# Patient Record
Sex: FEMALE | Race: WHITE | NOT HISPANIC OR LATINO | Employment: FULL TIME
[De-identification: names, ages, dates, MRNs, and addresses within clinical notes are randomized per-mention and may not be internally consistent; named-entity substitution may affect disease eponyms.]

---

## 2022-01-06 ENCOUNTER — PRE VISIT (OUTPATIENT)
Dept: OTHER | Age: 32
End: 2022-01-06
Payer: COMMERCIAL

## 2022-01-06 ENCOUNTER — TRANSCRIBE ORDERS (OUTPATIENT)
Dept: OTHER | Age: 32
End: 2022-01-06
Payer: COMMERCIAL

## 2022-01-06 ENCOUNTER — PATIENT OUTREACH (OUTPATIENT)
Dept: ONCOLOGY | Facility: CLINIC | Age: 32
End: 2022-01-06
Payer: COMMERCIAL

## 2022-01-06 DIAGNOSIS — Z80.0 FAMILY HISTORY OF LYNCH SYNDROME: Primary | ICD-10-CM

## 2022-01-06 NOTE — PROGRESS NOTES
Per NPR team, patient has not tested positive for Mayer, only father has. Will refer to Genetics team for counseling and testing.

## 2022-01-06 NOTE — PROGRESS NOTES
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 No genetic testing on file or in available CE documents. Message to NPR team for records prior to scheduling with Luz Maria Irving. Will update scheduling instructions in referral as able.

## 2022-01-06 NOTE — TELEPHONE ENCOUNTER
Action 1.6.22 10:57 AM STEVEN   Action Taken Called and spoke to pt. Pt has never had any genetic testing done.

## 2022-02-06 ENCOUNTER — HEALTH MAINTENANCE LETTER (OUTPATIENT)
Age: 32
End: 2022-02-06

## 2022-03-28 ENCOUNTER — VIRTUAL VISIT (OUTPATIENT)
Dept: ONCOLOGY | Facility: CLINIC | Age: 32
End: 2022-03-28
Attending: GENETIC COUNSELOR, MS
Payer: COMMERCIAL

## 2022-03-28 DIAGNOSIS — Z84.89: ICD-10-CM

## 2022-03-28 DIAGNOSIS — Z80.41 FAMILY HISTORY OF MALIGNANT NEOPLASM OF OVARY: ICD-10-CM

## 2022-03-28 DIAGNOSIS — Z80.0 FAMILY HISTORY OF LYNCH SYNDROME: Primary | ICD-10-CM

## 2022-03-28 DIAGNOSIS — Z80.0 FAMILY HISTORY OF COLON CANCER: ICD-10-CM

## 2022-03-28 DIAGNOSIS — Z80.42 FAMILY HISTORY OF PROSTATE CANCER: ICD-10-CM

## 2022-03-28 PROCEDURE — 96040 HC GENETIC COUNSELING, EACH 30 MINUTES: CPT | Mod: GT,95 | Performed by: GENETIC COUNSELOR, MS

## 2022-03-28 NOTE — PATIENT INSTRUCTIONS
Assessing Cancer Risk  Only about 5-10% of cancers are thought to be due to an inherited cancer susceptibility gene.    These families often have:    Several people with the same or related types of cancer    Cancers diagnosed at a young age (before age 50)    Individuals with more than one primary cancer    Multiple generations of the family affected with cancer    Mayer Syndrome Genes  We each inherit two copies of every gene in our bodies: one from our mother and one from our father.  Each gene has a specific job to do.  When a gene has a mistake or  mutation  in it, it does not work like it should. There are currently five genes known to cause Mayer Syndrome: MLH1, MSH2, MSH6, PMS2, and EPCAM.  A single mutation in one of the Mayer Syndrome genes increases the risk for colon, endometrial, ovarian, and stomach cancers.  Other cancers that can be seen in some families with Mayer Syndrome include pancreatic, bladder, biliary tract, urothelial, small bowel, prostate, breast and brain cancers.  The table below lists the chance that a person with Mayer syndrome would develop cancer in his or her lifetime1.      Lifetime Cancer Risks    General Population Mayer Syndrome   Colon 4.5% 12-52%   Endometrial 2.7% Up to 57%   Stomach <1% Up to 16%   Ovarian 1.3% Up to 38%     Inheriting a mutation does not mean a person will develop cancer, but it does significantly increase his or her risk above the general population risk.    Inheritance   Mayer Syndrome mutations are inherited in an autosomal dominant pattern.  This means that if a parent has a mutation, each of his or her children will have a 50% chance of inheriting that same mutation.  Therefore, each child--male or female--would have a 50% chance of being at increased risk for developing cancer.          Image obtained from Genetics Home Reference, 2013     Tumor Screening for Mayer Syndrome  There are two different tests that can be done on a person s colon or  endometrial tumor as an initial screen for Mayer Syndrome. These tests are called IHC (immunohistochemistry) and MSI (microsatellite instability). Tumors that show an absent protein on IHC or an unstable MSI result could be due to a mutation in one of the known Mayer Syndrome genes. The IHC results can also guide further genetic testing for Mayer Syndrome by indicating which gene should be tested.     Genetic Testing  Genetic testing involves a blood test and will look at the genetic information in the Mayer Syndrome genes for any harmful mutations that are associated with increased cancer risk.  If possible, it is recommended that the person(s) who has had cancer be tested first before other family members.  That person will give us the most useful information about whether or not a specific gene is associated with the cancer in the family.    Results  There are three possible results of Mayer Syndrome genetic testing:    Positive--a harmful mutation was identified     Negative--no mutation was identified     Variant of unknown significance--a variation in one of the genes was identified, but it is unclear how this impacts cancer risk in the family    Advantages and Disadvantages  There are advantages and disadvantages to genetic testing of these genes.    Advantages    May clarify your cancer risk    Can help you make medical decisions    May explain the cancers in your family    May give useful information to your family members (if you share your results)    Disadvantages    Possible negative emotional impact of learning about inherited cancer risk    Uncertainty in interpreting a negative test result in some situations    Possible genetic discrimination concerns (see below)    Genetic Information Nondiscrimination Act (ANNE MARIE)  ANNE MARIE is a federal law that protects individuals from health insurance or employment discrimination based on a genetic test result alone.  Although rare, there are currently no legal  protections in terms of life insurance, long term care, or disability insurances.  Visit the National Human Genome Research [place] genome.gov/08890065 to learn more.    Reducing Cancer Risk  Current screening recommendations for people with a Mayer Syndrome mutation include1:    Colorectal: Colonoscopy beginning at age 20-25 (or earlier based on family history); repeated every 1-2 years     Endometrial/Ovarian:   o Consider surgery to remove uterus, ovaries, and fallopian tubes. Timing of surgery can depend on multiple factors, such as whether childbearing is complete, comorbidities, family history, etc. There is not enough evidence to make specific recommendations for removal of the ovaries in women with MSH6 and PMS2 mutations.   o Talk to your doctor about possible endometrial biopsy, transvaginal ultrasound, and CA-125 blood test screening for endometrial and ovarian cancer. There are limitations to this type of screening.    Stomach: Screening for stomach cancer is largely dependent on certain factors, such as family history and ethnicity. This is due to the lack of supporting data for stomach cancer screening. Upper endoscopy (EGD with extended duodenoscopy) beginning at age 40 every 3-5 years can be considered.    Urinary Tract: Consider annual urinalysis starting at 30-35. This recommendation is individualized based on family history, but should be considered in those who have a mutation in MSH2 (especially males).     Brain: Annual physical examination beginning at age 25-30.     Prostate: There is currently not enough evidence to recommend specific prostate cancer screening for men with Mayer syndrome. However, men are encouraged to speak to their physicians about participating in NCCN guidelines for prostate cancer screening.    Depending upon the mutation in the family, dermatology evaluation or prostate screening may be recommended.  Early detection and prevention are primary goals of screening for  colorectal cancer.  These recommendations may change based on the specific gene mutation in the family.     Questions to Think About Regarding Genetic Testing    What effect will the test result have on me and my relationship with my family members if I have an inherited gene mutation?  If I don t have a gene mutation?    Should I share my test results, and how will my family react to this news, which may also affect them?    Are my children ready to learn new information that may one day affect their own health?    Resources  Mayer Syndrome International lynchcancers.com   Mayer Syndrome Screening Network lynchscreening.net   American Cancer Society (ACS) cancer.org   National Cancer [place] (NCI) cancer.gov     Please call us if you have any questions or concerns.   Cancer Risk Management Program [phone] ([phone])  ? Isai Crane, MS [facility] [phone]  ? Breanna Reyes, MS, [facility] [phone]  ? Meryl Hathaway, MS, [facility]  [phone]  ? Carly Bradley, MS, [facility]  [phone]  ? Susana Grant, MS, [facility]  [phone]  ? Tonie Burns, MS, [facility] [phone]  ? Julianna Kaminski, MS, [facility] [phone]    References  1. National Comprehensive Cancer Network. Clinical practice guidelines in oncology, colorectal cancer screening. Available online (registration required). 2019.

## 2022-03-28 NOTE — PROGRESS NOTES
Julienne is a 31 year old who is being evaluated via a billable video visit.      How would you like to obtain your AVS? MyChart  If the video visit is dropped, the invitation should be resent by: Text to cell phone: [phone]  Will anyone else be joining your video visit? Michelle Cha VF

## 2022-03-28 NOTE — LETTER
3/28/2022     RE: Julienne Matamoros  [address]  [facility] [address]    Dear Colleague,    Thank you for referring your patient, Julienne Matamoros, to the [facility]. Please see a copy of my visit note below.    3/28/2022    Referring Provider: Referred Self    Presenting Information:   I met with Julienne today through the Cancer Risk Management Program via video for genetic counseling to discuss the known MSH6 mutation in her family. 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 She is here today to review this history, cancer screening recommendations, and available genetic testing options.    Personal History:  Julienne is a 31 year old female.  She does not have any personal history of cancer.      She had her first menstrual period at age 11, her first child at age 31, and is premenopausal. She reports a history pf PCOS and that she is 9 months post-partum, periods have not resumed at this time. Julienne has her ovaries, fallopian tubes and uterus in place.  She reports past history of oral contraceptive use for about 8 years and that she has never been on hormone replacement therapy.      Her most recent OB-GYN exam and Pap smear in 2019 were normal.  She reports regular self-breast exams and clinical exams; has not started screening mammograms given her age. She has had two breast ultrasounds in the past few years due to palpable lumps; findings were benign. Colonoscopy in 2017 for IBS work-up was unremarkable and general population follow-up was recommended. She report regular (every 1-2 years) dermatology visits for skin checks.  Julienne reported no tobacco use, and social (0-2 drinks per week) alcohol use.    Family History: Cancer family history and pertinent information reviewed below (Please see scanned pedigree for detailed family history information)    Father, age 61, has no personal history of cancer. Has tested positive for the familial  MSH6 mutation via [facility].     Paternal aunt, age 59, has a history of multiple skin cancers (basal cells, squamous cells, and melanoma) that began in her late 40's; does have a history of sun tanning; history of essential thrombocythemia; currently had an endometrial biopsy that may have found abnormal calls and is being worked-up further. This aunt has tested positive for the familial MSH6 mutation.     Paternal aunt (identical twin to above aunt), age 59, has a history of cervical cancer diagnosed at age 40 and is s/p ROSE-BSO; DOLLY exposure reported during pregnancy. This aunt is also positive for the familial MSH6 mutation.     Paternal uncle passed at age 48 from esophageal cancer diagnosed at age 48; he was a smoker.    Paternal grandmother, age 85, was recently diagnosed with lung cancer; has past history of smoking.     Paternal grandfather, age 88, has a history of prostate cancer diagnosed at age 69 and colon cancer diagnosed at age 72; also has a history of sebaceous neoplasms, basal cell carcinoma, squamous cell carcinoma. This grandfather was the testing proband for the family in which the familial MSH6 mutation was originally found.     Paternal grandfather's sister had a jaw cancer diagnosed at an unknown age.     Paternal grandmother's sister had ovarian cancer diagnosed at age 54.     Multiple paternal first-cousins (grandfather's side) once-removed with cancer history including breast (at age 54 and 60), skin cancers (BCC, SCC), non-Hodgkin's lymphoma, prostate cancer, and melanoma.     There is no known Ashkenazi [employer] ancestry on either side of her family. There is no reported consanguinity.    Discussion:    Julienne has a family history of colon cancer with an identified MSH6 gene mutation.  Specifically, the mutation identified in her father, paternal grandfather, and two paternal aunts is called c.3514dup ([id number]).  We discussed that this gene is associated with Mayer syndrome and an  increased risk for colon, gynecologic, and other cancers.  We discussed the impact of this testing on Julienne and her family in detail.  Julienne shared that her relatives have all been tested through [facility], and she did send a copy of her father's [facility] report along to me shortly after our visit via [employer] in which the MSH6 c.3514dup ([id number]) was confirmed. She also gave me information to help link her grandfather's testing to her order through [facility]'s free family follow-up testing program.     We discussed the natural history and genetics of Mayer syndrome caused by mutations in the MSH6 gene. A detailed handout regarding this cancer syndrome and the information we discussed was provided to Julienne at the end of our appointment today and can be found in the after visit summary.  Topics included: inheritance pattern, cancer risks, cancer screening recommendations, and also risks, benefits and limitations of testing.    We reviewed that Mayer syndrome due to mutations in the MSH6 gene is associated with increased risk for colon cancer (10-44%), uterine cancer (16-49%), ovarian cancer (up to 13%), stomach cancer (up to 7.8%) and urinary tract cancers (up to 5.5%). We discussed that sebaceous neoplasms have also jessica seen in families with MSH6-related Mayer syndrome, although specific lifetime risks are not available.     We reviewed some of the the screeningmanagement recommendations for those with MSH6-related Mayer syndrome including earlier/more frequent colonoscopies and gynecologic cancer screening vs surgery. I did share that we have a clinical nurse specialist who helps coordinate this screening should Julienne test positive for the familial mutation.     We reviewed that mutations in the MSH6 gene are inherited in an autosomal dominant pattern.  This means that Julienne has a 50% chance of inheriting the same mutation which was identified in her father.  Mutations in this gene to not skip  generations.     Given her family history, Julienne meets National Comprehensive Cancer Network guidelines for genetic testing for the familial MSH6 mutation.     We briefly discussed the option of more comprehensive testing for additional genes associated with hereditary cancers. We discussed that many of her close-relatives' cancer histories (with the excpetion of her paternal grandfather in the context of his Mayer syndrome diagnosis) seem to have at least some level of known environmental exposure and are otherwise not overly concerning from an inherited risk perspective. We also discussed that the cancers seen in her paternal aunts and uncle are typically not those that we would associated with Mayer syndrome and may certainly be unrelated. Julienne expressed understanding and declined any additional testing at this time. She opted to proceed with targeted MSH6 testing through [facility].      Genetic testing is available for the familial MSH6 gene mutation identified in her father and other paternal relatives.  Julienne elected to proceed with this testing today.      Consent was obtained virtually with no witness required due to the current covid19 global pandemic.    The information from genetic testing may determine additional cancer screening recommendations as well as options for risk reducing surgeries for Julienne and her relatives.  These recommendations will be discussed in detail once genetic testing is completed.    Given the current COVID19 global pandemic, we discussed genetic testing options for Julienne including having [employer] send a saliva kit to her home or coming into the clinic for a blood draw. Julienne opted to have a salivia kit sent to her home to complete the genetic testing. A kit was ordered from [employer] and a saliva collection kit will be sent to Julienne's home address. I confirmed with Julienne that the address we have on file is her current and correct address. We discussed some of the  limitations of completing genetic testing via saliva and Julienne was instructed to follow the instruction provided in the kit to ensure the best possibility of success for saliva genetic testing.       Plan:  1) Today, Julienne elected to proceed with MSH6 genetic testing (1 gene) through [facility]'s family follow-up program. A saliva collection kit will be mailed to Julienne's home address by [facility]. Julienne is encouraged to follow the specimen collection and return mail instructions provided in the kit.  2) This information should be available in approximately 2-3 weeks from the lab receiving the sample.  3) Julienne will be contacted by our scheduling department to set up a result disclosure appointment.    Video start time: 2:12pm  Video end time: 3:05pm    Julianna Kaminski MS, [facility]  Licensed, Certified Genetic Counselor  [facility]  [phone]  [email or web address]    Julienne is a 31 year old who is being evaluated via a billable video visit.      How would you like to obtain your AVS? MyChart  If the video visit is dropped, the invitation should be resent by: Text to cell phone: [phone]  Will anyone else be joining your video visit? No      Evelin Cha VF

## 2022-04-13 NOTE — PROGRESS NOTES
"4/13/2022    Referring Provider: Referred Self    Presenting Information:   I spoke with Julienne over video today to discuss her genetic testing results. She submitted a saliva sample to [facility] on 03/31/2022 and MSH6 gene analysis was ordered. This testing was done because of her family history of MSH6-related Mayer syndrome.     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 This is the same mutation that was previously identified in her father. We discussed that this mutation is associated with a diagnosis of Mayer syndrome and increased risk for colon, gynecologic and other cancers. We discussed the impact of this testing on Julienne in detail. Of note, full MSH6 gene analysis was completed, and no other mutations were identified in the MSH6 gene.     A copy of the test report can be found in the Laboratory tab, dated 3/31/2022, and named \"Laboratory Miscellaneous Order\". The report is scanned in as a linked document.    Cancer Risks:  Individuals with mutations in the MSH6 gene have a:    10-44% lifetime risk of developing colon cancer. This is compared to 4.2% lifetime risk in the general population    Lifetime endometrial (uterine) cancer risk of 16-49%, compared to 3.1% in the general population.    Lifetime ovarian cancer risk of up to 13%, compared to 1.3% in the general population.    Additional risks are seen for stomach (up to 7.9%), small bowel (up to 4%), urinary tract (up to 5.5%), brain (up to 1.8%), and hepatobiliary tract cancers (<1%%). Some families also have increased risk for sebaceous neoplasms (specific risks not available).    Several studies have also suggested that individuals with Mayer syndrome may be at increased risk for other cancers, including breast cancer, but additional research is needed to clarify these potential risks.    Cancer Screening and Prevention:  The following " screening, per current National Comprehensive Cancer Network (NCCN) guidelines is recommended for individuals who have Mayer syndrome due to a mutation in the LTU2nmjs:    Colonoscopies starting at age 30-35 (or earlier based on family history), repeated every 1-2 years.    Annual physical/neurological exams starting at 25-30.     Possible consideration of upper endoscopy (EGD) with extended duodenoscopy starting at age 40, repeated every 3-5 years. This recommendation is largely dependent on family history and other factors.    There is limited evidence to suggest a screening strategy for urothelial/urinary tract cancers. Annual urinalysis starting at age 30-35 can be considered. Consideration for screening is largely dependent upon family history, gender, and specific Mayer syndrome mutation.    Women can consider hysterectomy due to the limitations in screening for endometrial cancer.     Timing of this surgery can be individualized based on family planning, family history, comorbidities, and the individual's gene mutation.      Screening for uterine cancer has not been shown to be beneficial in women with Mayer syndrome, however screening via endometrial biopsy every 1-2 years can be considered. In post-menopausal women, transvaginal ultrasound may also be considered.     Women with Mayer syndrome should be aware of the signs and symptoms of endometrial (uterine) cancer, such as abnormal uterine bleeding or postmenopausal bleeding, and should report these findings promptly to her physician, should they occur.     A bilateral salpingo-oophorectomy (removal of both ovaries and fallopian tubes) may reduce the chance to develop ovarian cancer.     Timing of this surgery may be individualized as with hysterectomy, noted above.     At this time, there is insufficient evidence to make a specific recommendations regarding removal of the ovaries and fallopian tubes in women with MSH6 mutation.     Of note, there is not  an effective screening method for ovarian cancer, but transvaginal ultrasound and a CA-125 blood test may be considered at the discretion of each individual's clinician.     Additionally, women should be aware of the signs and symptoms of ovarian cancer: pelvic/abdominal pain, bloating, increased abdominal girth, difficulty eating, early satiety, and urinary frequency or urgency. These symptoms, should be promptly reported to a physician.     There is limited evidence to recommend early or more frequent screening for prostate cancer in men who have Mayer syndrome. However, men with Mayer syndrome are encouraged to follow prostate cancer screening as recommended in the NCCN Guidelines for Prostate Early Detection. This screening should be discussed with each individual's medical provider.    There are currently no specific screening recommendations for other potential Mayer syndrome-related cancers such as breast cancer, pancreatic cancer, hepatobiliary tract cancer, etc. Screening for these and other cancers may be recommended based on family history.    Other population cancer screening options, such as those recommended by the American Cancer Society and the National Comprehensive Cancer Network (NCCN), are also appropriate for Julienne and her family. These screening recommendations may change if there are changes to Julienne's personal and/or family history. Final screening recommendations should be made by each individual's managing physician.    Of note, some chemotherapies/immunotherapies for certain cancers may be more effective in individuals with Mayer syndrome. Julienne should discuss this with her physicians, if chemotherapy is indicated in the future.    We discussed that Julienne could participate in our Cancer Risk Management Program in which our nursing specialist provides an individual screening plan and assists with medical management. A referral was placed to see MELISSA Perry for this  service.    Of note, the above information is based on our current understanding of Julienne's genetic findings. Julienne is encouraged to reach out to me regularly regarding any pertinent updates to her personal and/or family history of cancer, as our understanding of the genetic findings in her family may change over time.     Implications for Family Members:  We reviewed that mutations in the MSH6 gene are inherited in an autosomal dominant pattern. This means that each of Julienne's children have a 50% chance of inheriting the same mutation. Likewise, each of her siblings has a 50% risk of having the same mutation. Extended relatives may also carry this mutation, and she is encouraged to share this information with her family members on both sides of the family. I am happy to help her relatives connect with a genetic counselor in their area if they would like to discuss testing.    In rare situations in which both parents have a mutation in the MSH6 gene, their children each have a 25% risk for constitutional mismatch repair deficiency syndrome (CMMRD). CMMRD is a disorder that causes cafe-au-lait spots and risk for childhood cancers. For individuals of childbearing age with MSH6 mutations, genetic counseling and genetic testing may be advised for their partners.    Additional Testing Considerations:  Other relatives may carry the familial MSH6 mutation or a different gene mutation associated with their personal or family history of  cancer. Genetic counseling is recommended for her paternal relatives who have not yet completed testing to discuss genetic testing options.  If any of these relatives do pursue genetic testing, Julienne is encouraged to contact me so that we may review the impact of their test results on Julienne.    Support Resources:  I provided Julienne with information regarding national and local support resources.    Plan:  1.  I provided Julienne with a copy of her test results and support resources  today.  2.  She plans to follow up with her primary care provider for routine care.  3.  A referral was placed for Julienne to meet with MELISSA Perry to discuss screening associated with a MSH6 mutation.    If Julienne has additional questions, I encouraged her to contact me directly at [phone].     Time spent on video: 25 minutes    Julianna Kaminski MS, [facility] [place]  Licensed, Certified Genetic Counselor  [facility]  [phone]  [email or web address]

## 2022-04-14 ENCOUNTER — PATIENT OUTREACH (OUTPATIENT)
Dept: ONCOLOGY | Facility: CLINIC | Age: 32
End: 2022-04-14
Payer: COMMERCIAL

## 2022-04-14 ENCOUNTER — VIRTUAL VISIT (OUTPATIENT)
Dept: ONCOLOGY | Facility: CLINIC | Age: 32
End: 2022-04-14
Attending: GENETIC COUNSELOR, MS
Payer: COMMERCIAL

## 2022-04-14 DIAGNOSIS — Z80.41 FAMILY HISTORY OF MALIGNANT NEOPLASM OF OVARY: ICD-10-CM

## 2022-04-14 DIAGNOSIS — Z15.09 MSH6-RELATED LYNCH SYNDROME (HNPCC5): Primary | ICD-10-CM

## 2022-04-14 DIAGNOSIS — Z80.0 FAMILY HISTORY OF COLON CANCER: ICD-10-CM

## 2022-04-14 DIAGNOSIS — Z84.89: ICD-10-CM

## 2022-04-14 DIAGNOSIS — Z80.42 FAMILY HISTORY OF PROSTATE CANCER: ICD-10-CM

## 2022-04-14 DIAGNOSIS — Z80.0 FAMILY HISTORY OF LYNCH SYNDROME: ICD-10-CM

## 2022-04-14 PROCEDURE — 96040 HC GENETIC COUNSELING, EACH 30 MINUTES: CPT | Mod: GT,95 | Performed by: GENETIC COUNSELOR, MS

## 2022-04-14 NOTE — PROGRESS NOTES
Julienne is a 32 year old who is being evaluated via a billable video visit.      How would you like to obtain your AVS? MyChart  If the video visit is dropped, the invitation should be resent by: Text to cell phone: [phone]  Will anyone else be joining your video visit? Michelle Cha VF

## 2022-04-14 NOTE — PATIENT INSTRUCTIONS
Resources for Mayer Syndrome     [facility] Mayer Syndrome Support Group-   Email [email or web address] to be added to email distribution list for dates    Mayer Syndrome International -  [email or web address]   Mayer Syndrome [employer] (LSI) provides support for individuals with Mayer syndrome. This organization was founded by patients and health care providers who specialize in Mayer syndrome. [employer]SI strives to raise awareness, as well as educate others about Mayer syndrome.     Hereditary Colon Cancer Takes Guts - http://www.hcctakesguts.org/peer-support  This is a non-profit organization that supports and connects individuals with hereditary colon cancer syndromes.  They also promote research and health care initiatives.     I Have Mayer Syndrome - http://www.ihavelynchsyndrome.org/   The goal of this non-profit organization is to educate others and raise awareness about Mayer syndrome in the medical community, as well as the public.      Mayer Syndrome Screening Network - http://www.lynchscreening.net/   This organization was founded in 2011, with the goal to promote universal tumor screening for Mayer syndrome for those with a diagnosis of colorectal and endometrial cancers.     [employer] [place] - www.Amonixsclubtwincities.org   [employer] is a 501(c)3 nonprofit and the local affiliate of the Cancer Support Community, a network of more than 54 supportive, free and welcoming  clubhouses  where everyone living with cancer can come for social, emotional and psychological support. Clubs are healing environments where individuals learn from each other with guidance from licensed professionals.    [employer]: [place] [facility] - http://mnovarian.org/   MOCA was started in 1999 by a small group of ovarian cancer survivors who came together to fund ovarian cancer research, raise awareness of the disease, and provide support to women with ovarian cancer and their  families.    Other References:  Genetics Home Reference - http://ghr.nlm.nih.gov/condition/devlin-syndrome  American Cancer Society - www.cancer.org  How Do I Tell My Children? This article is about the BRCA gene for hereditary breast cancer, but the theme of the article applies to Devlin syndrome, too.  http://www.facingourrisk.org/understanding-brca-and-hboc/publications/newsletter/archives/2008winter/how-tell-children.php   National Society of Genetic Counselors: http://www.nsgc.org/

## 2022-04-14 NOTE — PROGRESS NOTES
Writer reviewed referral to [facility]. Appropriate for scheduling. Sent to New Patient Scheduling for completion.

## 2022-04-15 NOTE — PROGRESS NOTES
RECORDS STATUS - ALL OTHER DIAGNOSIS      Action    Action Taken 4/15/2022 8:04AM [facility]     I called pt Julienne - unavailable.      RECORDS RECEIVED FROM: [facility]   DATE RECEIVED: 4/20/2022   NOTES STATUS DETAILS   OFFICE NOTE from referring provider Complete [facility]    Referral Julianna Kaminski, records [facility]   OFFICE NOTE from medical oncologist     OPERATIVE REPORT     LABS     PATHOLOGY REPORTS     ANYTHING RELATED TO DIAGNOSIS Complete 3/31/2022   [facility]       GENONOMIC TESTING     TYPE:     IMAGING (NEED IMAGES & REPORT)     CT SCANS     MRI     MAMMO     ULTRASOUND     PET

## 2022-04-19 PROBLEM — Z15.09 MSH6-RELATED LYNCH SYNDROME (HNPCC5): Status: ACTIVE | Noted: 2022-03-31

## 2022-04-19 NOTE — PROGRESS NOTES
Julienne is a 32 year old who is being evaluated via a billable video visit.      How would you like to obtain your AVS? [email or web address]  If the video visit is dropped, the invitation should be resent by: Text to cell phone: [phone]  Will anyone else be joining your video visit? No    Video Start Time: 1115  Video-Visit Details    Type of service:  Video Visit    Video End Time:11:47 AM    Originating Location (pt. Location): Home    Distant Location (provider location):  [facility] CANCER [facility]     Platform used for Video Visit: Azima     Dorina Kenney    Oncology Risk Management Consultation:  Date on this visit: 4/20/2022    Julienne Matamoros  is referred by Julianna Kamniski [facility],  for an oncology risk management consultation. 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    Primary Physician: No Ref-Primary, Physician     History Of Present Illness:  Ms. Matamoros is a very pleasant, healthy 32 year old female who presents with MSH6+ associated Mayer Syndrome.    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  This is the same mutation that was previously identified in her father.    Menarche at age 11  First child at age 31  Premenopausal.   Currently breastfeeding her 9 month old son.  History of PCOS   Ovaries, fallopian tubes and uterus in place.    History of oral contraceptive use for about 8 years   No current form of birth control; pt would like to conceive another child.  No hx of hormone replacement therapy.    Hx of two  breast ultrasounds in the past few years due to palpable lumps; findings were benign.     Colonoscopy in 2017 for IBS work-up was unremarkable   Regular (every 1-2 years) dermatology visits for skin checks.        Past Medical/Surgical History:  Past Medical History:    Diagnosis Date     MSH6-related Mayer syndrome (HNPCC5) 2022    c.3514dup (p.[id number]*5)     No past surgical history on file.    Allergies:  Allergies as of 2022     (No Known Allergies)       Current Medications:  No current outpatient medications on file.        Family History:  Family History   Problem Relation Age of Onset     Mayer Syndrome Father         MSH6+  c.3514dup (p.[id number]*5)     Lung Cancer Paternal Grandmother 85        hx of smoking     Prostate Cancer Paternal Grandfather 69     Colon Cancer Paternal Grandfather 72     Mayer Syndrome Paternal Grandfather         MSH6+     Skin Cancer Paternal Grandfather         melanoma, basal cell carcinoma, squamous cell carcinoma     Skin Cancer Paternal Aunt 47        melanoma, basal cell carcinoma, squamous cell carcinoma; hx of tanning bed use     Thrombocytopenia Paternal Aunt      Mayer Syndrome Paternal Aunt         MSH6+     Cervical Cancer Paternal Aunt 40     Other - See Comments Paternal Aunt         DOLLY exposure in utero     Mayer Syndrome Paternal Aunt         MSH6+     Esophageal Cancer Paternal Uncle 48         at 48     Cancer Other         jaw cancer; paternal grandfather's sister     Ovarian Cancer Other 54        paternal grandmother's sister     Cancer Other         multiple first cousins once removed:  breast (at age 54 and 60), skin cancers (BCC, SCC), non-Hodgkin's lymphoma, prostate cancer, and melanoma.       Social History:  Social History     Socioeconomic History     Marital status:      Spouse name: Babar     Number of children: 1     Years of education: 18     Highest education level: Master's degree (e.g., MA, MS, Ivy, MEd, MSW, DEVIKA)   Occupational History     Occupation: Infection Prevention Specialist     Employer: [employer]   Tobacco Use     Smoking status: Never Smoker     Smokeless tobacco: Never Used   Substance and Sexual Activity     Alcohol use: Not on file     Drug use: Not on file      Sexual activity: Yes     Partners: Male     Birth control/protection: None   Other Topics Concern     Not on file   Social History Narrative     Not on file     Social Determinants of Health     Financial Resource Strain: Not on file   Food Insecurity: Not on file   Transportation Needs: Not on file   Physical Activity: Not on file   Stress: Not on file   Social Connections: Not on file   Intimate Partner Violence: Not on file   Housing Stability: Not on file     Review of Systems:  GENERAL: No change in weight, sleep or appetite.  Normal energy.  No fever or chills  EYES: Negative for vision changes or eye problems  ENT: No problems with ears, nose or throat.  No difficulty swallowing.  RESP: No coughing, wheezing or shortness of breath  CV: No chest pains or palpitations  GI: No nausea, vomiting,  heartburn, abdominal pain, diarrhea, constipation or change in bowel habits; notes blood in the stool from hemorrhoids from time to time. Notes a history of IBS, but manages that with gluten free diet.   : No urinary frequency or dysuria, bladder or kidney problems  MUSCULOSKELETAL: No significant muscle or joint pains  NEUROLOGIC: No headaches, numbness, tingling, weakness, problems with balance or coordination  PSYCHIATRIC: No problems with anxiety, depression or mental health  HEME/IMMUNE/ALLERGY: No history of bleeding or clotting problems or anemia.  No allergies or immune system problems  ENDOCRINE: No history of thyroid disease, diabetes or other endocrine disorders  SKIN: No rashes,worrisome lesions or skin problems    Physical Exam:  There were no vitals taken for this visit.  GENERAL: Healthy, alert and no distress  EYES: Eyes grossly normal to inspection.  No discharge or erythema, or obvious scleral/conjunctival abnormalities.  RESP: No audible wheeze, cough, or visible cyanosis.  No visible retractions or increased work of breathing.    SKIN: Visible skin clear. No significant rash, abnormal  pigmentation or lesions.  NEURO: Cranial nerves grossly intact.  Mentation and speech appropriate for age.  PSYCH: Mentation appears normal, affect normal/bright, judgement and insight intact, normal speech and appearance well-groomed.    Laboratory/Imaging Studies  No results found for any visits on 04/20/22.    ASSESSMENT  We discussed the differences between cancer risk for the general population and those with MSH6+ mutations.  We also discussed that inheriting a mutation does not mean that a person will develop cancer, but rather that they are at increased risk.       Pathogenic variants are estimated to be prevalent in 1:758 people within the general population (Rogelio et al 2017).   Site  Estimated Average Age of Presentation for MSH6+ carriers Cumulative Risk for Diagnosis Through Age 80 Cumulative Risk for Diagnosis Through Lifetime for people in the general population     Colorectal    42-69 years   10-44%    4.2%   Endometrial 53-55 years   16-49%    3.1%     Ovarian    46 years   <1%-13%    1.3%     Renal pelvis and/or ureter    65-69 years   0.7-5.5%   Less than 1%     Bladder    71 years 1.0-8.5% 2.4%   Gastric  2 cases reported at ages 45 and 81 <1% -7.9% 0.9%   Small bowel 54 years   <1% - 4%    0.3%     Pancreas    No data   1.4-1.6%   1.6%     Biliary tract    No data   0.2-<1%    0.2%     Prostate    63 years   2.5-11.6%    11.6%   Breast (female)    No data 11.1-12.8%    12.8%     Brain    43-54 years    0.8-1.8%    0.6%       We also discussed the risks and benefits of a prophylactic hysterectomy and  after child bearing. That would be a strong recommendation for her, in order to reduce her risk for endometrial cancer.  She may also consider annual endometrial biopsies and annual transvaginal ultrasounds to view the endometrial stripe and can have this discussion with her primary care provider.  We discussed that the recommendation for a bilateral salpingo-ooperectomy is less clear and depends on  her personal and family history, as the risk for ovarian cancer with MSH6+ ranges widely from <1% to 13% over the course of a woman's lifetime.  At this point, she is not sure what her paternal aunt's endometrial biopsy showed; she will let me know, as this could help factor into her own screening and family planning.      The [employer] Mayer Syndrome group was recommended to the patient and the patient is in agreement to be contacted for meeting notices.    Julienne would like to have all of her care done at [facility], as her insurance covers her expenses better there; she will ask her primary care provider to order the appropriate screenings for her.     She also may benefit from following  a healthy lifestyle plan recommended by both NCCN and the American Cancer Society that can reduce the risk of cancer:  1 Limit alcohol consumption to less than 1 drink per day (1 drink=5 oz.wine, 12 oz. Beer or 1.5 oz. 80-proof liquor) for women or 2 drinks per day for men.     2. Exercise per American Cancer Society guidelines of at least 150 minutes of moderate-intensity activity or 75 minutes of vigorous activity each week. (Or a combination of both.) Exercise should be spread  out over the week. Aim for 45-60 minutes per day.    3. Maintain a healthy weight with a Body Mass Index between 19-24.9.    4. Do not use tobacco products and limit exposure to passive smoke.    5. Avoid processed meats (ham, atkins, salami, hot dogs, sausages). Eat little, if any.     6. Limit red meat (pork, beef and lamb) to 12-18 oz per week or less.    7. Limit consumption of sugar sweetened drinks, fast foods, processed foods and foods high in starch or sugar content.    8. Eat about 90 grams (3 servings) of whole grain foods pr day. Whole grains offer Vitamin E, ligans, phytoestrogens, zinc, selenium, antioxidants, resistant starch and copper. Research shows that eating 3 servings of whole grains can reduce the risk for colon cancer about  17%.  Focus on vegetables, fruits, beans and plant based foods.      Individualized Surveillance Plan for Mayer Syndrome   (MSH6+ and PMS2+ mutation carriers)   Based on NCCN Guidelines Version 1.2022   Type of Screening Recommendation Last Done Next Due   Colon Cancer Screening Colonoscopy at age 30-35 years or 2-5 years prior to the earliest colon cancer in the family if it is diagnosed prior to age 30.     Repeat every 1-2 years.  2017 Due now   Endometrial and Ovarian Cancer Consider Prophylactic hysterectomy and bilateral salpingo-oophorectomy (BSO) can be considered by women who have completed childbearing.    Insufficient evidence exists to make specific recommendation for RRSO in MSH6+ and PMS2+ carriers.     Discuss endometrial sampling with patients   Hysterectomy recommended after childbearing    Screening using  endometrial sampling is an option every 1-2 years; women should be aware that dysfunctional uterine bleeding warrants evaluation.    Data do not support ovarian cancer screening for Mayer Syndrome. Annual transvaginal ultrasound and  tests may be considered at a clinician's discretion.  Consider annual transvaginal US and endometrial biopsies.   Gastric and small bowel cancer Selected individuals or families or those of  descent may consider EGD with extended duodenoscopy (to distal duodenum or into the jejunum) every 3-5 years beginning at age 40. No family history of stomach cancer   Review in future.   Urothelial cancer Consider annual urinalysis at age 30-35 in MSH6+ families with family history of urothelial cancers   Discuss   Should have both UA and urine cytology to assess for urothelial carcinoma   Pancreatic screening for MSH6+ with 1st or 2nd degree relatives with pancreatic cancer on Mayer side of family Annual contrast-enhanced MRI/MRCP and/or EUS, with consideration of shorter screening intervals for individuals found to have worrisome abnormalities on screening.     Most  small cystic lesions found on screening will not warrant biopsy, surgical resection, or any other intervention. No family history of pancreatic cancer   Review at next visit   Prostate Screening  Annual PSA test with general population screening; may begin earlier if younger prostate cancers in the family NA NA   Central Nervous System cancer Annual physical/neurological examinations starting at age 25-30. 4/20/2022 April 2023       There are data to suggest that aspirin may decrease the risk of colon cancer in Mayer Syndrome.  However, optimal dose and duration of aspirin therapy is uncertain.    There have been suggestions that there is an increased risk for breast cancer in Mayer Syndrome patients; however, there is not enough evidence to support increased screening above average risk breast cancer screening.     I spent a total of 60 minutes on the day of the visit. Please see the note for further information on patient assessment and treatment.    AI Hernandez-CNS, OCN, AGN-BC  Clinical Nurse Specialist  Cancer Risk Management Program  [facility]      CC: JEIMY Matthews

## 2022-04-20 ENCOUNTER — VIRTUAL VISIT (OUTPATIENT)
Dept: ONCOLOGY | Facility: CLINIC | Age: 32
End: 2022-04-20
Attending: GENETIC COUNSELOR, MS
Payer: COMMERCIAL

## 2022-04-20 ENCOUNTER — PRE VISIT (OUTPATIENT)
Dept: ONCOLOGY | Facility: CLINIC | Age: 32
End: 2022-04-20

## 2022-04-20 DIAGNOSIS — Z80.41 FAMILY HISTORY OF MALIGNANT NEOPLASM OF OVARY: ICD-10-CM

## 2022-04-20 DIAGNOSIS — Z80.0 FAMILY HISTORY OF COLON CANCER: ICD-10-CM

## 2022-04-20 DIAGNOSIS — Z15.09 MSH6-RELATED LYNCH SYNDROME (HNPCC5): Primary | ICD-10-CM

## 2022-04-20 DIAGNOSIS — Z84.89: ICD-10-CM

## 2022-04-20 DIAGNOSIS — Z80.42 FAMILY HISTORY OF PROSTATE CANCER: ICD-10-CM

## 2022-04-20 DIAGNOSIS — Z80.0 FAMILY HISTORY OF LYNCH SYNDROME: ICD-10-CM

## 2022-04-20 PROBLEM — R56.9 SEIZURE (H): Status: ACTIVE | Noted: 2022-04-20

## 2022-04-20 PROBLEM — F41.1 GENERALIZED ANXIETY DISORDER: Status: ACTIVE | Noted: 2017-04-04

## 2022-04-20 PROBLEM — M62.81 MUSCLE WEAKNESS: Status: ACTIVE | Noted: 2021-09-01

## 2022-04-20 PROCEDURE — 99205 OFFICE O/P NEW HI 60 MIN: CPT | Mod: 95 | Performed by: CLINICAL NURSE SPECIALIST

## 2022-04-20 NOTE — LETTER
4/20/2022         RE: Julienne Matamoros  [address]  [facility] [address]        Dear Colleague,    Thank you for referring your patient, Julienne Matamoros, to the [facility]. Please see a copy of my visit note below.    Julienne is a 32 year old who is being evaluated via a billable video visit.      How would you like to obtain your AVS? MyChart  If the video visit is dropped, the invitation should be resent by: Text to cell phone: [phone]  Will anyone else be joining your video visit? No    Video Start Time: 1115  Video-Visit Details    Type of service:  Video Visit    Video End Time:11:47 AM    Originating Location (pt. Location): Home    Distant Location (provider location):  [facility]     Platform used for Video Visit: Kailyn Kenney    Oncology Risk Management Consultation:  Date on this visit: 4/20/2022    Julienne Matamoros  is referred by Julianna Kaminski [facility],  for an oncology risk management consultation. 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    Primary Physician: No Ref-Primary, Physician     History Of Present Illness:  Ms. Matamoros is a very pleasant, healthy 32 year old female who presents with MSH6+ associated Mayer Syndrome.    Genetic Testing:  3/31/2022-  POSITIVE for a pathogenic MSH6 mutation, specifically, c.3514dup (p.[id number]*5), identified using single site analysis through [employer].  This is the same mutation that was previously identified in her father.    Menarche at age 11  First child at age 31  Premenopausal.   Currently breastfeeding her 9 month old son.  History of PCOS   Ovaries, fallopian tubes and uterus in place.    History of oral contraceptive use for about 8 years   No current form of birth control; pt would like to conceive another child.  No hx of hormone replacement therapy.    Hx of two  breast  ultrasounds in the past few years due to palpable lumps; findings were benign.     Colonoscopy in 2017 for IBS work-up was unremarkable   Regular (every 1-2 years) dermatology visits for skin checks.        Past Medical/Surgical History:  Past Medical History:   Diagnosis Date     MSH6-related Mayer syndrome (HNPCC5) 2022    c.3514dup (p.[id number]*5)     No past surgical history on file.    Allergies:  Allergies as of 2022     (No Known Allergies)       Current Medications:  No current outpatient medications on file.        Family History:  Family History   Problem Relation Age of Onset     Mayer Syndrome Father         MSH6+  c.3514dup (p.[id number]*5)     Lung Cancer Paternal Grandmother 85        hx of smoking     Prostate Cancer Paternal Grandfather 69     Colon Cancer Paternal Grandfather 72     Mayer Syndrome Paternal Grandfather         MSH6+     Skin Cancer Paternal Grandfather         melanoma, basal cell carcinoma, squamous cell carcinoma     Skin Cancer Paternal Aunt 47        melanoma, basal cell carcinoma, squamous cell carcinoma; hx of tanning bed use     Thrombocytopenia Paternal Aunt      Mayer Syndrome Paternal Aunt         MSH6+     Cervical Cancer Paternal Aunt 40     Other - See Comments Paternal Aunt         DOLLY exposure in utero     Mayer Syndrome Paternal Aunt         MSH6+     Esophageal Cancer Paternal Uncle 48         at 48     Cancer Other         jaw cancer; paternal grandfather's sister     Ovarian Cancer Other 54        paternal grandmother's sister     Cancer Other         multiple first cousins once removed:  breast (at age 54 and 60), skin cancers (BCC, SCC), non-Hodgkin's lymphoma, prostate cancer, and melanoma.       Social History:  Social History     Socioeconomic History     Marital status:      Spouse name: Babar     Number of children: 1     Years of education: 18     Highest education level: Master's degree (e.g., MA, MS, Ivy, MEd, MSW, DEVIKA)    Occupational History     Occupation: Infection Prevention Specialist     Employer: [employer]   Tobacco Use     Smoking status: Never Smoker     Smokeless tobacco: Never Used   Substance and Sexual Activity     Alcohol use: Not on file     Drug use: Not on file     Sexual activity: Yes     Partners: Male     Birth control/protection: None   Other Topics Concern     Not on file   Social History Narrative     Not on file     Social Determinants of Health     Financial Resource Strain: Not on file   Food Insecurity: Not on file   Transportation Needs: Not on file   Physical Activity: Not on file   Stress: Not on file   Social Connections: Not on file   Intimate Partner Violence: Not on file   Housing Stability: Not on file     Review of Systems:  GENERAL: No change in weight, sleep or appetite.  Normal energy.  No fever or chills  EYES: Negative for vision changes or eye problems  ENT: No problems with ears, nose or throat.  No difficulty swallowing.  RESP: No coughing, wheezing or shortness of breath  CV: No chest pains or palpitations  GI: No nausea, vomiting,  heartburn, abdominal pain, diarrhea, constipation or change in bowel habits; notes blood in the stool from hemorrhoids from time to time. Notes a history of IBS, but manages that with gluten free diet.   : No urinary frequency or dysuria, bladder or kidney problems  MUSCULOSKELETAL: No significant muscle or joint pains  NEUROLOGIC: No headaches, numbness, tingling, weakness, problems with balance or coordination  PSYCHIATRIC: No problems with anxiety, depression or mental health  HEME/IMMUNE/ALLERGY: No history of bleeding or clotting problems or anemia.  No allergies or immune system problems  ENDOCRINE: No history of thyroid disease, diabetes or other endocrine disorders  SKIN: No rashes,worrisome lesions or skin problems    Physical Exam:  There were no vitals taken for this visit.  GENERAL: Healthy, alert and no distress  EYES: Eyes grossly normal  to inspection.  No discharge or erythema, or obvious scleral/conjunctival abnormalities.  RESP: No audible wheeze, cough, or visible cyanosis.  No visible retractions or increased work of breathing.    SKIN: Visible skin clear. No significant rash, abnormal pigmentation or lesions.  NEURO: Cranial nerves grossly intact.  Mentation and speech appropriate for age.  PSYCH: Mentation appears normal, affect normal/bright, judgement and insight intact, normal speech and appearance well-groomed.    Laboratory/Imaging Studies  No results found for any visits on 04/20/22.    ASSESSMENT  We discussed the differences between cancer risk for the general population and those with MSH6+ mutations.  We also discussed that inheriting a mutation does not mean that a person will develop cancer, but rather that they are at increased risk.       Pathogenic variants are estimated to be prevalent in 1:758 people within the general population (Rogelio et al 2017).   Site  Estimated Average Age of Presentation for MSH6+ carriers Cumulative Risk for Diagnosis Through Age 80 Cumulative Risk for Diagnosis Through Lifetime for people in the general population     Colorectal    42-69 years   10-44%    4.2%   Endometrial 53-55 years   16-49%    3.1%     Ovarian    46 years   <1%-13%    1.3%     Renal pelvis and/or ureter    65-69 years   0.7-5.5%   Less than 1%     Bladder    71 years 1.0-8.5% 2.4%   Gastric  2 cases reported at ages 45 and 81 <1% -7.9% 0.9%   Small bowel 54 years   <1% - 4%    0.3%     Pancreas    No data   1.4-1.6%   1.6%     Biliary tract    No data   0.2-<1%    0.2%     Prostate    63 years   2.5-11.6%    11.6%   Breast (female)    No data 11.1-12.8%    12.8%     Brain    43-54 years    0.8-1.8%    0.6%       We also discussed the risks and benefits of a prophylactic hysterectomy and  after child bearing. That would be a strong recommendation for her, in order to reduce her risk for endometrial cancer.  She may also consider  annual endometrial biopsies and annual transvaginal ultrasounds to view the endometrial stripe and can have this discussion with her primary care provider.  We discussed that the recommendation for a bilateral salpingo-ooperectomy is less clear and depends on her personal and family history, as the risk for ovarian cancer with MSH6+ ranges widely from <1% to 13% over the course of a woman's lifetime.  At this point, she is not sure what her paternal aunt's endometrial biopsy showed; she will let me know, as this could help factor into her own screening and family planning.      The [employer] Mayer Syndrome group was recommended to the patient and the patient is in agreement to be contacted for meeting notices.    Julienne would like to have all of her care done at [facility], as her insurance covers her expenses better there; she will ask her primary care provider to order the appropriate screenings for her.     She also may benefit from following  a healthy lifestyle plan recommended by both NCCN and the American Cancer Society that can reduce the risk of cancer:  1 Limit alcohol consumption to less than 1 drink per day (1 drink=5 oz.wine, 12 oz. Beer or 1.5 oz. 80-proof liquor) for women or 2 drinks per day for men.     2. Exercise per American Cancer Society guidelines of at least 150 minutes of moderate-intensity activity or 75 minutes of vigorous activity each week. (Or a combination of both.) Exercise should be spread  out over the week. Aim for 45-60 minutes per day.    3. Maintain a healthy weight with a Body Mass Index between 19-24.9.    4. Do not use tobacco products and limit exposure to passive smoke.    5. Avoid processed meats (ham, atkins, salami, hot dogs, sausages). Eat little, if any.     6. Limit red meat (pork, beef and lamb) to 12-18 oz per week or less.    7. Limit consumption of sugar sweetened drinks, fast foods, processed foods and foods high in starch or sugar content.    8. Eat about 90  grams (3 servings) of whole grain foods pr day. Whole grains offer Vitamin E, ligans, phytoestrogens, zinc, selenium, antioxidants, resistant starch and copper. Research shows that eating 3 servings of whole grains can reduce the risk for colon cancer about 17%.  Focus on vegetables, fruits, beans and plant based foods.      Individualized Surveillance Plan for Mayer Syndrome   (MSH6+ and PMS2+ mutation carriers)   Based on NCCN Guidelines Version 1.2022   Type of Screening Recommendation Last Done Next Due   Colon Cancer Screening Colonoscopy at age 30-35 years or 2-5 years prior to the earliest colon cancer in the family if it is diagnosed prior to age 30.     Repeat every 1-2 years.  2017 Due now   Endometrial and Ovarian Cancer Consider Prophylactic hysterectomy and bilateral salpingo-oophorectomy (BSO) can be considered by women who have completed childbearing.    Insufficient evidence exists to make specific recommendation for RRSO in MSH6+ and PMS2+ carriers.     Discuss endometrial sampling with patients   Hysterectomy recommended after childbearing    Screening using  endometrial sampling is an option every 1-2 years; women should be aware that dysfunctional uterine bleeding warrants evaluation.    Data do not support ovarian cancer screening for Mayer Syndrome. Annual transvaginal ultrasound and  tests may be considered at a clinician's discretion.  Consider annual transvaginal US and endometrial biopsies.   Gastric and small bowel cancer Selected individuals or families or those of  descent may consider EGD with extended duodenoscopy (to distal duodenum or into the jejunum) every 3-5 years beginning at age 40. No family history of stomach cancer   Review in future.   Urothelial cancer Consider annual urinalysis at age 30-35 in MSH6+ families with family history of urothelial cancers   Discuss   Should have both UA and urine cytology to assess for urothelial carcinoma   Pancreatic screening for  MSH6+ with 1st or 2nd degree relatives with pancreatic cancer on Mayer side of family Annual contrast-enhanced MRI/MRCP and/or EUS, with consideration of shorter screening intervals for individuals found to have worrisome abnormalities on screening.     Most small cystic lesions found on screening will not warrant biopsy, surgical resection, or any other intervention. No family history of pancreatic cancer   Review at next visit   Prostate Screening  Annual PSA test with general population screening; may begin earlier if younger prostate cancers in the family NA NA   Central Nervous System cancer Annual physical/neurological examinations starting at age 25-30. 4/20/2022 April 2023       There are data to suggest that aspirin may decrease the risk of colon cancer in Mayer Syndrome.  However, optimal dose and duration of aspirin therapy is uncertain.    There have been suggestions that there is an increased risk for breast cancer in Mayer Syndrome patients; however, there is not enough evidence to support increased screening above average risk breast cancer screening.     I spent a total of 60 minutes on the day of the visit. Please see the note for further information on patient assessment and treatment.          Again, thank you for allowing me to participate in the care of your patient.      Sincerely,    AI Hernandez CNS

## 2022-04-20 NOTE — LETTER
Cancer Risk Management  Program Locations    [facility]  [facility]  [facility]  [facility]  Mailing Address  Cancer Risk Management Program  [facility]  [address]  [place] [address]    New patient appointments  [phone]  April 20, 2022      Julienne Matamoros  [address]  [place] [facility] [address]      Dear Julienne,    It was a pleasure meeting with you today.  Below is a copy of my note from our visit, outlining your surveillance plan.      I look forward to seeing you in the future to coordinate your care and reduce your health risks. Please feel free to contact me if you have any questions or concerns.      Julienne is a 32 year old who is being evaluated via a billable video visit.      How would you like to obtain your AVS? MyChart  If the video visit is dropped, the invitation should be resent by: Text to cell phone: [phone]  Will anyone else be joining your video visit? No    Video Start Time: 1115  Video-Visit Details    Type of service:  Video Visit    Video End Time:11:47 AM    Originating Location (pt. Location): Home    Distant Location (provider location):  [facility]     Platform used for Video Visit: Kailyn Kenney    Oncology Risk Management Consultation:  Date on this visit: 4/20/2022    Julienne Matamoros  is referred by Julianna Kaminski [facility],  for an oncology risk management consultation. 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    Primary Physician: No Ref-Primary, Physician     History Of Present Illness:  Ms. Matamoros is a very pleasant, healthy 32 year old female who presents with MSH6+ associated Mayer Syndrome.    Genetic Testing:  3/31/2022-  POSITIVE for a pathogenic MSH6 mutation,  specifically, c.3514dup (p.[id number]*5), identified using single site analysis through [employer].  This is the same mutation that was previously identified in her father.    Menarche at age 11  First child at age 31  Premenopausal.   Currently breastfeeding her 9 month old son.  History of PCOS   Ovaries, fallopian tubes and uterus in place.    History of oral contraceptive use for about 8 years   No current form of birth control; pt would like to conceive another child.  No hx of hormone replacement therapy.    Hx of two  breast ultrasounds in the past few years due to palpable lumps; findings were benign.     Colonoscopy in 2017 for IBS work-up was unremarkable   Regular (every 1-2 years) dermatology visits for skin checks.        Past Medical/Surgical History:  Past Medical History:   Diagnosis Date     MSH6-related Mayer syndrome (HNPCC5) 2022    c.3514dup (p.[id number]*5)     No past surgical history on file.    Allergies:  Allergies as of 2022     (No Known Allergies)       Current Medications:  No current outpatient medications on file.        Family History:  Family History   Problem Relation Age of Onset     Mayer Syndrome Father         MSH6+  c.3514dup (p.[id number]*5)     Lung Cancer Paternal Grandmother 85        hx of smoking     Prostate Cancer Paternal Grandfather 69     Colon Cancer Paternal Grandfather 72     Mayer Syndrome Paternal Grandfather         MSH6+     Skin Cancer Paternal Grandfather         melanoma, basal cell carcinoma, squamous cell carcinoma     Skin Cancer Paternal Aunt 47        melanoma, basal cell carcinoma, squamous cell carcinoma; hx of tanning bed use     Thrombocytopenia Paternal Aunt      Mayer Syndrome Paternal Aunt         MSH6+     Cervical Cancer Paternal Aunt 40     Other - See Comments Paternal Aunt         DOLLY exposure in utero     Mayer Syndrome Paternal Aunt         MSH6+     Esophageal Cancer Paternal Uncle 48         at 48     Cancer  Other         jaw cancer; paternal grandfather's sister     Ovarian Cancer Other 54        paternal grandmother's sister     Cancer Other         multiple first cousins once removed:  breast (at age 54 and 60), skin cancers (BCC, SCC), non-Hodgkin's lymphoma, prostate cancer, and melanoma.       Social History:  Social History     Socioeconomic History     Marital status:      Spouse name: Babar     Number of children: 1     Years of education: 18     Highest education level: Master's degree (e.g., MA, MS, Ivy, MEd, MSW, DEVIKA)   Occupational History     Occupation: Infection Prevention Specialist     Employer: [employer]   Tobacco Use     Smoking status: Never Smoker     Smokeless tobacco: Never Used   Substance and Sexual Activity     Alcohol use: Not on file     Drug use: Not on file     Sexual activity: Yes     Partners: Male     Birth control/protection: None   Other Topics Concern     Not on file   Social History Narrative     Not on file     Social Determinants of Health     Financial Resource Strain: Not on file   Food Insecurity: Not on file   Transportation Needs: Not on file   Physical Activity: Not on file   Stress: Not on file   Social Connections: Not on file   Intimate Partner Violence: Not on file   Housing Stability: Not on file     Review of Systems:  GENERAL: No change in weight, sleep or appetite.  Normal energy.  No fever or chills  EYES: Negative for vision changes or eye problems  ENT: No problems with ears, nose or throat.  No difficulty swallowing.  RESP: No coughing, wheezing or shortness of breath  CV: No chest pains or palpitations  GI: No nausea, vomiting,  heartburn, abdominal pain, diarrhea, constipation or change in bowel habits; notes blood in the stool from hemorrhoids from time to time. Notes a history of IBS, but manages that with gluten free diet.   : No urinary frequency or dysuria, bladder or kidney problems  MUSCULOSKELETAL: No significant muscle or joint  pains  NEUROLOGIC: No headaches, numbness, tingling, weakness, problems with balance or coordination  PSYCHIATRIC: No problems with anxiety, depression or mental health  HEME/IMMUNE/ALLERGY: No history of bleeding or clotting problems or anemia.  No allergies or immune system problems  ENDOCRINE: No history of thyroid disease, diabetes or other endocrine disorders  SKIN: No rashes,worrisome lesions or skin problems    Physical Exam:  There were no vitals taken for this visit.  GENERAL: Healthy, alert and no distress  EYES: Eyes grossly normal to inspection.  No discharge or erythema, or obvious scleral/conjunctival abnormalities.  RESP: No audible wheeze, cough, or visible cyanosis.  No visible retractions or increased work of breathing.    SKIN: Visible skin clear. No significant rash, abnormal pigmentation or lesions.  NEURO: Cranial nerves grossly intact.  Mentation and speech appropriate for age.  PSYCH: Mentation appears normal, affect normal/bright, judgement and insight intact, normal speech and appearance well-groomed.    Laboratory/Imaging Studies  No results found for any visits on 04/20/22.    ASSESSMENT  We discussed the differences between cancer risk for the general population and those with MSH6+ mutations.  We also discussed that inheriting a mutation does not mean that a person will develop cancer, but rather that they are at increased risk.       Pathogenic variants are estimated to be prevalent in 1:758 people within the general population (Rogelio et al 2017).   Site  Estimated Average Age of Presentation for MSH6+ carriers Cumulative Risk for Diagnosis Through Age 80 Cumulative Risk for Diagnosis Through Lifetime for people in the general population     Colorectal    42-69 years   10-44%    4.2%   Endometrial 53-55 years   16-49%    3.1%     Ovarian    46 years   <1%-13%    1.3%     Renal pelvis and/or ureter    65-69 years   0.7-5.5%   Less than 1%     Bladder    71 years 1.0-8.5% 2.4%   Gastric  2  cases reported at ages 45 and 81 <1% -7.9% 0.9%   Small bowel 54 years   <1% - 4%    0.3%     Pancreas    No data   1.4-1.6%   1.6%     Biliary tract    No data   0.2-<1%    0.2%     Prostate    63 years   2.5-11.6%    11.6%   Breast (female)    No data 11.1-12.8%    12.8%     Brain    43-54 years    0.8-1.8%    0.6%       We also discussed the risks and benefits of a prophylactic hysterectomy and  after child bearing. That would be a strong recommendation for her, in order to reduce her risk for endometrial cancer.  She may also consider annual endometrial biopsies and annual transvaginal ultrasounds to view the endometrial stripe and can have this discussion with her primary care provider.  We discussed that the recommendation for a bilateral salpingo-ooperectomy is less clear and depends on her personal and family history, as the risk for ovarian cancer with MSH6+ ranges widely from <1% to 13% over the course of a woman's lifetime.  At this point, she is not sure what her paternal aunt's endometrial biopsy showed; she will let me know, as this could help factor into her own screening and family planning.      The [facility] Mayer Syndrome group was recommended to the patient and the patient is in agreement to be contacted for meeting notices.    Julienne would like to have all of her care done at [facility], as her insurance covers her expenses better there; she will ask her primary care provider to order the appropriate screenings for her.     She also may benefit from following  a healthy lifestyle plan recommended by both NCCN and the American Cancer Society that can reduce the risk of cancer:  1 Limit alcohol consumption to less than 1 drink per day (1 drink=5 oz.wine, 12 oz. Beer or 1.5 oz. 80-proof liquor) for women or 2 drinks per day for men.     2. Exercise per American Cancer Society guidelines of at least 150 minutes of moderate-intensity activity or 75 minutes of vigorous activity each week. (Or a  combination of both.) Exercise should be spread  out over the week. Aim for 45-60 minutes per day.    3. Maintain a healthy weight with a Body Mass Index between 19-24.9.    4. Do not use tobacco products and limit exposure to passive smoke.    5. Avoid processed meats (ham, atkins, salami, hot dogs, sausages). Eat little, if any.     6. Limit red meat (pork, beef and lamb) to 12-18 oz per week or less.    7. Limit consumption of sugar sweetened drinks, fast foods, processed foods and foods high in starch or sugar content.    8. Eat about 90 grams (3 servings) of whole grain foods pr day. Whole grains offer Vitamin E, ligans, phytoestrogens, zinc, selenium, antioxidants, resistant starch and copper. Research shows that eating 3 servings of whole grains can reduce the risk for colon cancer about 17%.  Focus on vegetables, fruits, beans and plant based foods.      Individualized Surveillance Plan for Mayer Syndrome   (MSH6+ and PMS2+ mutation carriers)   Based on NCCN Guidelines Version 1.2022   Type of Screening Recommendation Last Done Next Due   Colon Cancer Screening Colonoscopy at age 30-35 years or 2-5 years prior to the earliest colon cancer in the family if it is diagnosed prior to age 30.     Repeat every 1-2 years.  2017 Due now   Endometrial and Ovarian Cancer Consider Prophylactic hysterectomy and bilateral salpingo-oophorectomy (BSO) can be considered by women who have completed childbearing.    Insufficient evidence exists to make specific recommendation for RRSO in MSH6+ and PMS2+ carriers.     Discuss endometrial sampling with patients   Hysterectomy recommended after childbearing    Screening using  endometrial sampling is an option every 1-2 years; women should be aware that dysfunctional uterine bleeding warrants evaluation.    Data do not support ovarian cancer screening for Mayer Syndrome. Annual transvaginal ultrasound and  tests may be considered at a clinician's discretion.  Consider annual  transvaginal US and endometrial biopsies.   Gastric and small bowel cancer Selected individuals or families or those of  descent may consider EGD with extended duodenoscopy (to distal duodenum or into the jejunum) every 3-5 years beginning at age 40. No family history of stomach cancer   Review in future.   Urothelial cancer Consider annual urinalysis at age 30-35 in MSH6+ families with family history of urothelial cancers   Discuss   Should have both UA and urine cytology to assess for urothelial carcinoma   Pancreatic screening for MSH6+ with 1st or 2nd degree relatives with pancreatic cancer on Mayer side of family Annual contrast-enhanced MRI/MRCP and/or EUS, with consideration of shorter screening intervals for individuals found to have worrisome abnormalities on screening.     Most small cystic lesions found on screening will not warrant biopsy, surgical resection, or any other intervention. No family history of pancreatic cancer   Review at next visit   Prostate Screening  Annual PSA test with general population screening; may begin earlier if younger prostate cancers in the family NA NA   Central Nervous System cancer Annual physical/neurological examinations starting at age 25-30. 4/20/2022 April 2023       There are data to suggest that aspirin may decrease the risk of colon cancer in Mayer Syndrome.  However, optimal dose and duration of aspirin therapy is uncertain.    There have been suggestions that there is an increased risk for breast cancer in Mayer Syndrome patients; however, there is not enough evidence to support increased screening above average risk breast cancer screening.     I spent a total of 60 minutes on the day of the visit. Please see the note for further information on patient assessment and treatment.    Luz Maria Irving, AI-CNS, OCN, AGN-BC  Clinical Nurse Specialist  Cancer Risk Management Program  [facility]      CC: JEIMY Matthews

## 2022-04-20 NOTE — LETTER
4/20/2022      RE: Julienne Matamoros  [address]  [facility] [address]       Julienne is a 32 year old who is being evaluated via a billable video visit.      How would you like to obtain your AVS? MyChart  If the video visit is dropped, the invitation should be resent by: Text to cell phone: [phone]  Will anyone else be joining your video visit? No    Video Start Time: 1115  Video-Visit Details    Type of service:  Video Visit    Video End Time:11:47 AM    Originating Location (pt. Location): Home    Distant Location (provider location):  [facility]     Platform used for Video Visit: Numerify     Dorina Kenney    Oncology Risk Management Consultation:  Date on this visit: 4/20/2022    Julienne Matamoros  is referred by Julianna Kaminski [facility],  for an oncology risk management consultation. 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    Primary Physician: No Ref-Primary, Physician     History Of Present Illness:  Ms. Matamoros is a very pleasant, healthy 32 year old female who presents with MSH6+ associated Mayer Syndrome.    Genetic Testing:  3/31/2022-  POSITIVE for a pathogenic MSH6 mutation, specifically, c.3514dup (p.[id number]*5), identified using single site analysis through [employer].  This is the same mutation that was previously identified in her father.    Menarche at age 11  First child at age 31  Premenopausal.   Currently breastfeeding her 9 month old son.  History of PCOS   Ovaries, fallopian tubes and uterus in place.    History of oral contraceptive use for about 8 years   No current form of birth control; pt would like to conceive another child.  No hx of hormone replacement therapy.    Hx of two  breast ultrasounds in the past few years due to palpable lumps; findings were benign.     Colonoscopy in 2017 for IBS work-up was unremarkable   Regular (every 1-2 years) dermatology visits  for skin checks.        Past Medical/Surgical History:  Past Medical History:   Diagnosis Date     MSH6-related Mayer syndrome (HNPCC5) 2022    c.3514dup (p.[id number]*5)     No past surgical history on file.    Allergies:  Allergies as of 2022     (No Known Allergies)       Current Medications:  No current outpatient medications on file.        Family History:  Family History   Problem Relation Age of Onset     Mayer Syndrome Father         MSH6+  c.3514dup (p.[id number]*5)     Lung Cancer Paternal Grandmother 85        hx of smoking     Prostate Cancer Paternal Grandfather 69     Colon Cancer Paternal Grandfather 72     Mayer Syndrome Paternal Grandfather         MSH6+     Skin Cancer Paternal Grandfather         melanoma, basal cell carcinoma, squamous cell carcinoma     Skin Cancer Paternal Aunt 47        melanoma, basal cell carcinoma, squamous cell carcinoma; hx of tanning bed use     Thrombocytopenia Paternal Aunt      Mayer Syndrome Paternal Aunt         MSH6+     Cervical Cancer Paternal Aunt 40     Other - See Comments Paternal Aunt         DOLLY exposure in utero     Mayer Syndrome Paternal Aunt         MSH6+     Esophageal Cancer Paternal Uncle 48         at 48     Cancer Other         jaw cancer; paternal grandfather's sister     Ovarian Cancer Other 54        paternal grandmother's sister     Cancer Other         multiple first cousins once removed:  breast (at age 54 and 60), skin cancers (BCC, SCC), non-Hodgkin's lymphoma, prostate cancer, and melanoma.       Social History:  Social History     Socioeconomic History     Marital status:      Spouse name: Babar     Number of children: 1     Years of education: 18     Highest education level: Master's degree (e.g., MA, MS, Ivy, MEd, MSW, DEVIKA)   Occupational History     Occupation: Infection Prevention Specialist     Employer: [employer]   Tobacco Use     Smoking status: Never Smoker     Smokeless tobacco: Never Used    Substance and Sexual Activity     Alcohol use: Not on file     Drug use: Not on file     Sexual activity: Yes     Partners: Male     Birth control/protection: None   Other Topics Concern     Not on file   Social History Narrative     Not on file     Social Determinants of Health     Financial Resource Strain: Not on file   Food Insecurity: Not on file   Transportation Needs: Not on file   Physical Activity: Not on file   Stress: Not on file   Social Connections: Not on file   Intimate Partner Violence: Not on file   Housing Stability: Not on file     Review of Systems:  GENERAL: No change in weight, sleep or appetite.  Normal energy.  No fever or chills  EYES: Negative for vision changes or eye problems  ENT: No problems with ears, nose or throat.  No difficulty swallowing.  RESP: No coughing, wheezing or shortness of breath  CV: No chest pains or palpitations  GI: No nausea, vomiting,  heartburn, abdominal pain, diarrhea, constipation or change in bowel habits; notes blood in the stool from hemorrhoids from time to time. Notes a history of IBS, but manages that with gluten free diet.   : No urinary frequency or dysuria, bladder or kidney problems  MUSCULOSKELETAL: No significant muscle or joint pains  NEUROLOGIC: No headaches, numbness, tingling, weakness, problems with balance or coordination  PSYCHIATRIC: No problems with anxiety, depression or mental health  HEME/IMMUNE/ALLERGY: No history of bleeding or clotting problems or anemia.  No allergies or immune system problems  ENDOCRINE: No history of thyroid disease, diabetes or other endocrine disorders  SKIN: No rashes,worrisome lesions or skin problems    Physical Exam:  There were no vitals taken for this visit.  GENERAL: Healthy, alert and no distress  EYES: Eyes grossly normal to inspection.  No discharge or erythema, or obvious scleral/conjunctival abnormalities.  RESP: No audible wheeze, cough, or visible cyanosis.  No visible retractions or increased  work of breathing.    SKIN: Visible skin clear. No significant rash, abnormal pigmentation or lesions.  NEURO: Cranial nerves grossly intact.  Mentation and speech appropriate for age.  PSYCH: Mentation appears normal, affect normal/bright, judgement and insight intact, normal speech and appearance well-groomed.    Laboratory/Imaging Studies  No results found for any visits on 04/20/22.    ASSESSMENT  We discussed the differences between cancer risk for the general population and those with MSH6+ mutations.  We also discussed that inheriting a mutation does not mean that a person will develop cancer, but rather that they are at increased risk.       Pathogenic variants are estimated to be prevalent in 1:758 people within the general population (Rogelio et al 2017).   Site  Estimated Average Age of Presentation for MSH6+ carriers Cumulative Risk for Diagnosis Through Age 80 Cumulative Risk for Diagnosis Through Lifetime for people in the general population     Colorectal    42-69 years   10-44%    4.2%   Endometrial 53-55 years   16-49%    3.1%     Ovarian    46 years   <1%-13%    1.3%     Renal pelvis and/or ureter    65-69 years   0.7-5.5%   Less than 1%     Bladder    71 years 1.0-8.5% 2.4%   Gastric  2 cases reported at ages 45 and 81 <1% -7.9% 0.9%   Small bowel 54 years   <1% - 4%    0.3%     Pancreas    No data   1.4-1.6%   1.6%     Biliary tract    No data   0.2-<1%    0.2%     Prostate    63 years   2.5-11.6%    11.6%   Breast (female)    No data 11.1-12.8%    12.8%     Brain    43-54 years    0.8-1.8%    0.6%       We also discussed the risks and benefits of a prophylactic hysterectomy and  after child bearing. That would be a strong recommendation for her, in order to reduce her risk for endometrial cancer.  She may also consider annual endometrial biopsies and annual transvaginal ultrasounds to view the endometrial stripe and can have this discussion with her primary care provider.  We discussed that the  recommendation for a bilateral salpingo-ooperectomy is less clear and depends on her personal and family history, as the risk for ovarian cancer with MSH6+ ranges widely from <1% to 13% over the course of a woman's lifetime.  At this point, she is not sure what her paternal aunt's endometrial biopsy showed; she will let me know, as this could help factor into her own screening and family planning.      The [facility] Mayer Syndrome group was recommended to the patient and the patient is in agreement to be contacted for meeting notices.    Julienne would like to have all of her care done at [facility], as her insurance covers her expenses better there; she will ask her primary care provider to order the appropriate screenings for her.     She also may benefit from following  a healthy lifestyle plan recommended by both NCCN and the American Cancer Society that can reduce the risk of cancer:  1 Limit alcohol consumption to less than 1 drink per day (1 drink=5 oz.wine, 12 oz. Beer or 1.5 oz. 80-proof liquor) for women or 2 drinks per day for men.     2. Exercise per American Cancer Society guidelines of at least 150 minutes of moderate-intensity activity or 75 minutes of vigorous activity each week. (Or a combination of both.) Exercise should be spread  out over the week. Aim for 45-60 minutes per day.    3. Maintain a healthy weight with a Body Mass Index between 19-24.9.    4. Do not use tobacco products and limit exposure to passive smoke.    5. Avoid processed meats (ham, atkins, salami, hot dogs, sausages). Eat little, if any.     6. Limit red meat (pork, beef and lamb) to 12-18 oz per week or less.    7. Limit consumption of sugar sweetened drinks, fast foods, processed foods and foods high in starch or sugar content.    8. Eat about 90 grams (3 servings) of whole grain foods pr day. Whole grains offer Vitamin E, ligans, phytoestrogens, zinc, selenium, antioxidants, resistant starch and copper. Research shows that  eating 3 servings of whole grains can reduce the risk for colon cancer about 17%.  Focus on vegetables, fruits, beans and plant based foods.      Individualized Surveillance Plan for Mayer Syndrome   (MSH6+ and PMS2+ mutation carriers)   Based on NCCN Guidelines Version 1.2022   Type of Screening Recommendation Last Done Next Due   Colon Cancer Screening Colonoscopy at age 30-35 years or 2-5 years prior to the earliest colon cancer in the family if it is diagnosed prior to age 30.     Repeat every 1-2 years.  2017 Due now   Endometrial and Ovarian Cancer Consider Prophylactic hysterectomy and bilateral salpingo-oophorectomy (BSO) can be considered by women who have completed childbearing.    Insufficient evidence exists to make specific recommendation for RRSO in MSH6+ and PMS2+ carriers.     Discuss endometrial sampling with patients   Hysterectomy recommended after childbearing    Screening using  endometrial sampling is an option every 1-2 years; women should be aware that dysfunctional uterine bleeding warrants evaluation.    Data do not support ovarian cancer screening for Mayer Syndrome. Annual transvaginal ultrasound and  tests may be considered at a clinician's discretion.  Consider annual transvaginal US and endometrial biopsies.   Gastric and small bowel cancer Selected individuals or families or those of  descent may consider EGD with extended duodenoscopy (to distal duodenum or into the jejunum) every 3-5 years beginning at age 40. No family history of stomach cancer   Review in future.   Urothelial cancer Consider annual urinalysis at age 30-35 in MSH6+ families with family history of urothelial cancers   Discuss   Should have both UA and urine cytology to assess for urothelial carcinoma   Pancreatic screening for MSH6+ with 1st or 2nd degree relatives with pancreatic cancer on Mayer side of family Annual contrast-enhanced MRI/MRCP and/or EUS, with consideration of shorter screening intervals  for individuals found to have worrisome abnormalities on screening.     Most small cystic lesions found on screening will not warrant biopsy, surgical resection, or any other intervention. No family history of pancreatic cancer   Review at next visit   Prostate Screening  Annual PSA test with general population screening; may begin earlier if younger prostate cancers in the family NA NA   Central Nervous System cancer Annual physical/neurological examinations starting at age 25-30. 4/20/2022 April 2023       There are data to suggest that aspirin may decrease the risk of colon cancer in Mayer Syndrome.  However, optimal dose and duration of aspirin therapy is uncertain.    There have been suggestions that there is an increased risk for breast cancer in Mayer Syndrome patients; however, there is not enough evidence to support increased screening above average risk breast cancer screening.     I spent a total of 60 minutes on the day of the visit. Please see the note for further information on patient assessment and treatment.            AI Hernandez CNS

## 2022-04-20 NOTE — NURSING NOTE
Please reconcile medications. Patient is currently taking prenatal vitamin and 50 MG of Sertraline daily.     Dorina Kenney

## 2022-04-20 NOTE — LETTER
April 20, 2022       TO: Julienne Matamroos  [address]  [facility] [address]       DearMs.Satunrino,    We are writing to inform you of your test results.    {p results letter list:[id number]}    No results found from the In Basket message.    ***

## 2022-04-26 DIAGNOSIS — Z15.09 MSH6-RELATED LYNCH SYNDROME (HNPCC5): Primary | ICD-10-CM

## 2022-04-27 ENCOUNTER — TELEPHONE (OUTPATIENT)
Dept: ONCOLOGY | Facility: CLINIC | Age: 32
End: 2022-04-27
Payer: COMMERCIAL

## 2022-04-27 NOTE — TELEPHONE ENCOUNTER
4/27/22 M to call [phone] opt5, opt2 to schedule the following for Luz Maria Irving:     1. Colonoscopy soon - call [phone] to schedule    2. Lab appointment soon    3. Labs and video visit with Luz Maria in April 2023    -Arjun

## 2022-04-28 ENCOUNTER — TELEPHONE (OUTPATIENT)
Dept: GASTROENTEROLOGY | Facility: CLINIC | Age: 32
End: 2022-04-28
Payer: COMMERCIAL

## 2022-04-28 ENCOUNTER — HOSPITAL ENCOUNTER (OUTPATIENT)
Facility: AMBULATORY SURGERY CENTER | Age: 32
End: 2022-04-28
Attending: COLON & RECTAL SURGERY
Payer: COMMERCIAL

## 2022-04-28 DIAGNOSIS — Z11.59 ENCOUNTER FOR SCREENING FOR OTHER VIRAL DISEASES: Primary | ICD-10-CM

## 2022-04-28 NOTE — TELEPHONE ENCOUNTER
Screening Questions    BlueKIND OF PREP RedLOCATION [review exclusion criteria] GreenSEDATION TYPE      1. Are you active on mychart? Y    2. What insurance is in the chart? BCBS      3.   Ordering/Referring Provider: Luz Maria Irving APRN CNS    4. BMI   (If greater than 40 review exclusion criteria [PAC APPT IF [MAC] @ [facility])  22.7  [If yes, BMI OVER 40-EXTENDED PREP]      **(Sedation review/consideration needed)**  Do you have a legal guardian or Medical Power of    and/or are you able to give consent for your medical care?     SELF     5. Have you had a positive covid test in the last 90 days?   N     6.  Are you currently on dialysis?   N [ If yes, G-PREP & HOSPITAL setting ONLY]     7.  Do you have chronic kidney disease?  N [ If yes, G-PREP ]    8.   Do you have a diagnosis of diabetes?   N   [ If yes, G-PREP ]    9.  On a regular basis do you go 3-5 days between bowel movements?   N   [ If yes, EXTENDED PREP]    10.  Are you taking any prescription pain medications on a routine schedule?    N  [ If yes, EXTENDED PREP] [If yes, MAC]      11.   Do you have any chemical dependencies such as alcohol, street drugs, or methadone?    N [If yes, MAC]    12.   Do you have any history of post-traumatic stress syndrome, severe anxiety or history of psychosis?    N  [If yes, MAC]    13.  [FEMALES] Are you currently pregnant? N    If yes, how many weeks?       Respiratory/Heart Screening:  [If yes to any of the following HOSPITAL setting only]     14. Do you have Pulmonary Hypertension [Lungs]?   N       15. Do you have UNCONTROLLED asthma?   N     16.  Do you use daily home oxygen?  N      17. Do you have mod to severe Obstructive Sleep Apnea?         (OKAY @ [facility]  [facility]  SH  PH  RI  MG - if pt is not on OXYGEN)  N      18.   Have you had a heart or lung transplant?   N      19.   Have you had a stroke or Transient ischemic attack (TIA - aka  mini stroke ) within 6 months?  (If yes, please review  exclusion criteria)  N     20.   In the past 6 months, have you had any heart related issues including cardiomyopathy or heart attack?   N           If yes, did it require cardiac stenting or other implantable device?   N      21.   Do you have any implantable devices in your body (pacemaker, defib, LVAD)? (If yes, please review exclusion criteria)  N     22. Do you take nitroglycerin?   N           If yes, how often? N  (if yes, HOSPITAL setting ONLY)    23.  Are you currently taking any blood thinners?    [If yes, INFORM patient to follw up w/ ORDERING PROVIDER FOR BRIDGING INSTRUCTIONS]     N    24.   Do you transfer independently?                (If NO, please HOSPITAL setting ONLY)  Y    25.   Preferred LOCAL Pharmacy for Pre Prescription:      [address] [place]    Scheduling Details  (Please ask for phone number if not scheduled by patient)      Caller : Julienne Matamoros    Date of Procedure: 05/18/2022  Surgeon: EITAN   [facility] [address] [place] [address], 5th Floor, [place] [address]      Sedation Type: MAC  l PER 1ST AVAIL   Conscious Sedation- Needs  for 6 hours after the procedure  MAC/General-Needs  for 24 hours after procedure    N :[Pre-op Required] at [facility]  SH  MG and OR for MAC sedation   (advise patient they will need a pre-op WITH IN 30 DAYS of procedure date)     Type of Procedure Scheduled:   Lower Endoscopy [Colonoscopy]    Which Colonoscopy Prep was Sent?:   SM  - PER PROTOCOL     EBENEZER CF PATIENTS & GROEN'S PATIENTS NEEDS EXTENDED PREP       Informed patient they will need an adult  Y  Cannot take any type of public or medical transportation alone    Pre-Procedure Covid test to be completed at [facility] or Externally: Y    Confirmed Nurse will call to complete assessment Y    Additional comments: N

## 2022-05-11 ENCOUNTER — TELEPHONE (OUTPATIENT)
Dept: GASTROENTEROLOGY | Facility: CLINIC | Age: 32
End: 2022-05-11
Payer: COMMERCIAL

## 2022-05-11 NOTE — TELEPHONE ENCOUNTER
Procedure: colon    Date, Location, and Surgeon of Procedure Cancelled: 05/18 [facility] Mario Alberto Montoya MD      Ordering Provider:Luz Maria Irving APRN CNS    Reason for cancel (please be detailed, any staff messages or encounters to note?): PROVIDER OUT        Rescheduled: Y     If rescheduled:    Date: 05/18   Location: [facility]   Prep Resent: Y(changes to prep?)   Covid Test Rescheduled: N   Note any change or update to original order/sedation:

## 2022-05-14 ENCOUNTER — LAB (OUTPATIENT)
Dept: LAB | Facility: CLINIC | Age: 32
End: 2022-05-14
Payer: COMMERCIAL

## 2022-05-14 ENCOUNTER — LAB (OUTPATIENT)
Dept: LAB | Facility: CLINIC | Age: 32
End: 2022-05-14

## 2022-05-14 DIAGNOSIS — Z11.59 ENCOUNTER FOR SCREENING FOR OTHER VIRAL DISEASES: ICD-10-CM

## 2022-05-14 DIAGNOSIS — Z15.09 MSH6-RELATED LYNCH SYNDROME (HNPCC5): ICD-10-CM

## 2022-05-14 LAB
ALBUMIN UR-MCNC: NEGATIVE MG/DL
APPEARANCE UR: CLEAR
BILIRUB UR QL STRIP: NEGATIVE
COLOR UR AUTO: ABNORMAL
GLUCOSE UR STRIP-MCNC: NEGATIVE MG/DL
HGB UR QL STRIP: NEGATIVE
KETONES UR STRIP-MCNC: NEGATIVE MG/DL
LEUKOCYTE ESTERASE UR QL STRIP: NEGATIVE
MUCOUS THREADS #/AREA URNS LPF: PRESENT /LPF
NITRATE UR QL: NEGATIVE
PH UR STRIP: 7 [PH] (ref 5–7)
RBC URINE: 1 /HPF
SARS-COV-2 RNA RESP QL NAA+PROBE: NEGATIVE
SP GR UR STRIP: 1.01 (ref 1–1.03)
SQUAMOUS EPITHELIAL: <1 /HPF
UROBILINOGEN UR STRIP-MCNC: NORMAL MG/DL
WBC URINE: 0 /HPF

## 2022-05-14 PROCEDURE — U0005 INFEC AGEN DETEC AMPLI PROBE: HCPCS | Mod: 90 | Performed by: PATHOLOGY

## 2022-05-14 PROCEDURE — 88112 CYTOPATH CELL ENHANCE TECH: CPT | Mod: 26 | Performed by: PATHOLOGY

## 2022-05-14 PROCEDURE — 99000 SPECIMEN HANDLING OFFICE-LAB: CPT | Performed by: PATHOLOGY

## 2022-05-14 PROCEDURE — 88112 CYTOPATH CELL ENHANCE TECH: CPT | Mod: TC | Performed by: CLINICAL NURSE SPECIALIST

## 2022-05-14 PROCEDURE — 81001 URINALYSIS AUTO W/SCOPE: CPT | Performed by: PATHOLOGY

## 2022-05-14 PROCEDURE — U0003 INFECTIOUS AGENT DETECTION BY NUCLEIC ACID (DNA OR RNA); SEVERE ACUTE RESPIRATORY SYNDROME CORONAVIRUS 2 (SARS-COV-2) (CORONAVIRUS DISEASE [COVID-19]), AMPLIFIED PROBE TECHNIQUE, MAKING USE OF HIGH THROUGHPUT TECHNOLOGIES AS DESCRIBED BY CMS-2020-01-R: HCPCS | Mod: 90 | Performed by: PATHOLOGY

## 2022-05-16 LAB
PATH REPORT.COMMENTS IMP SPEC: NORMAL
PATH REPORT.FINAL DX SPEC: NORMAL
PATH REPORT.GROSS SPEC: NORMAL
PATH REPORT.RELEVANT HX SPEC: NORMAL

## 2022-05-18 ENCOUNTER — HOSPITAL ENCOUNTER (OUTPATIENT)
Facility: CLINIC | Age: 32
Discharge: HOME | End: 2022-05-18
Attending: INTERNAL MEDICINE | Admitting: INTERNAL MEDICINE
Payer: COMMERCIAL

## 2022-05-18 VITALS
RESPIRATION RATE: 27 BRPM | WEIGHT: 150 LBS | SYSTOLIC BLOOD PRESSURE: 100 MMHG | OXYGEN SATURATION: 95 % | BODY MASS INDEX: 23.54 KG/M2 | HEART RATE: 86 BPM | DIASTOLIC BLOOD PRESSURE: 70 MMHG | HEIGHT: 67 IN

## 2022-05-18 LAB — COLONOSCOPY: NORMAL

## 2022-05-18 PROCEDURE — 250N000011 HC RX IP 250 OP 636: Performed by: INTERNAL MEDICINE

## 2022-05-18 PROCEDURE — G0105 COLORECTAL SCRN; HI RISK IND: HCPCS | Performed by: INTERNAL MEDICINE

## 2022-05-18 PROCEDURE — 45378 DIAGNOSTIC COLONOSCOPY: CPT | Performed by: INTERNAL MEDICINE

## 2022-05-18 PROCEDURE — G0500 MOD SEDAT ENDO SERVICE >5YRS: HCPCS | Performed by: INTERNAL MEDICINE

## 2022-05-18 RX ORDER — FENTANYL CITRATE 50 UG/ML
INJECTION, SOLUTION INTRAMUSCULAR; INTRAVENOUS PRN
Status: COMPLETED | OUTPATIENT
Start: 2022-05-18 | End: 2022-05-18

## 2022-05-18 RX ORDER — NALOXONE HYDROCHLORIDE 0.4 MG/ML
0.4 INJECTION, SOLUTION INTRAMUSCULAR; INTRAVENOUS; SUBCUTANEOUS
Status: CANCELLED | OUTPATIENT
Start: 2022-05-18

## 2022-05-18 RX ORDER — ONDANSETRON 4 MG/1
4 TABLET, ORALLY DISINTEGRATING ORAL EVERY 6 HOURS PRN
Status: CANCELLED | OUTPATIENT
Start: 2022-05-18

## 2022-05-18 RX ORDER — NALOXONE HYDROCHLORIDE 0.4 MG/ML
0.2 INJECTION, SOLUTION INTRAMUSCULAR; INTRAVENOUS; SUBCUTANEOUS
Status: CANCELLED | OUTPATIENT
Start: 2022-05-18

## 2022-05-18 RX ORDER — LIDOCAINE 40 MG/G
CREAM TOPICAL
Status: DISCONTINUED | OUTPATIENT
Start: 2022-05-18 | End: 2022-05-18 | Stop reason: HOSPADM

## 2022-05-18 RX ORDER — ONDANSETRON 2 MG/ML
4 INJECTION INTRAMUSCULAR; INTRAVENOUS EVERY 6 HOURS PRN
Status: CANCELLED | OUTPATIENT
Start: 2022-05-18

## 2022-05-18 RX ORDER — ONDANSETRON 2 MG/ML
4 INJECTION INTRAMUSCULAR; INTRAVENOUS
Status: DISCONTINUED | OUTPATIENT
Start: 2022-05-18 | End: 2022-05-18 | Stop reason: HOSPADM

## 2022-05-18 RX ORDER — FLUMAZENIL 0.1 MG/ML
0.2 INJECTION, SOLUTION INTRAVENOUS
Status: CANCELLED | OUTPATIENT
Start: 2022-05-18 | End: 2022-05-18

## 2022-05-18 RX ORDER — PROCHLORPERAZINE MALEATE 10 MG
10 TABLET ORAL EVERY 6 HOURS PRN
Status: CANCELLED | OUTPATIENT
Start: 2022-05-18

## 2022-05-18 RX ADMIN — MIDAZOLAM 2 MG: 1 INJECTION INTRAMUSCULAR; INTRAVENOUS at 08:44

## 2022-05-18 RX ADMIN — MIDAZOLAM 1 MG: 1 INJECTION INTRAMUSCULAR; INTRAVENOUS at 08:47

## 2022-05-18 RX ADMIN — FENTANYL CITRATE 100 MCG: 50 INJECTION, SOLUTION INTRAMUSCULAR; INTRAVENOUS at 08:43

## 2022-05-18 RX ADMIN — FENTANYL CITRATE 50 MCG: 50 INJECTION, SOLUTION INTRAMUSCULAR; INTRAVENOUS at 08:46

## 2022-05-18 NOTE — H&P
Julienne Matamoros  [id number]  female  32 year old      Reason for procedure/surgery: devlin syndrome    Patient Active Problem List   Diagnosis     MSH6-related Devlin syndrome (HNPCC5)     Generalized anxiety disorder     IBS (irritable bowel syndrome)     Muscle weakness     Seizure (H)       Past Surgical History:  History reviewed. No pertinent surgical history.    Past Medical History:   Past Medical History:   Diagnosis Date     Depressive disorder      MSH6-related Devlin syndrome (HNPCC5) 2022    c.3514dup (p.[id number]*5)       Social History:   Social History     Tobacco Use     Smoking status: Never Smoker     Smokeless tobacco: Never Used   Substance Use Topics     Alcohol use: Yes     Comment: social       Family History:   Family History   Problem Relation Age of Onset     Devlin Syndrome Father         MSH6+  c.3514dup (p.[id number]*5)     Lung Cancer Paternal Grandmother 85        hx of smoking     Prostate Cancer Paternal Grandfather 69     Colon Cancer Paternal Grandfather 72     Devlin Syndrome Paternal Grandfather         MSH6+     Skin Cancer Paternal Grandfather         melanoma, basal cell carcinoma, squamous cell carcinoma     Skin Cancer Paternal Aunt 47        melanoma, basal cell carcinoma, squamous cell carcinoma; hx of tanning bed use     Thrombocytopenia Paternal Aunt      Devlin Syndrome Paternal Aunt         MSH6+     Cervical Cancer Paternal Aunt 40     Other - See Comments Paternal Aunt         DOLLY exposure in utero     Devlin Syndrome Paternal Aunt         MSH6+     Esophageal Cancer Paternal Uncle 48         at 48     Cancer Other         jaw cancer; paternal grandfather's sister     Ovarian Cancer Other 54        paternal grandmother's sister     Cancer Other         multiple first cousins once removed:  breast (at age 54 and 60), skin cancers (BCC, SCC), non-Hodgkin's lymphoma, prostate cancer, and melanoma.       Allergies: No Known Allergies    Active Medications:   No  "current outpatient medications on file.       Systemic Review:   CONSTITUTIONAL: NEGATIVE for fever, chills, change in weight  ENT/MOUTH: NEGATIVE for ear, mouth and throat problems  RESP: NEGATIVE for significant cough or SOB  CV: NEGATIVE for chest pain, palpitations or peripheral edema    Physical Examination:   Vital Signs: /81   Pulse 118   Resp 8   Ht 1.702 m (5' 7\")   Wt 68 kg (150 lb)   SpO2 99%   BMI 23.49 kg/m    GENERAL: healthy, alert and no distress  NECK: no adenopathy, no asymmetry, masses, or scars  RESP: lungs clear to auscultation - no rales, rhonchi or wheezes  CV: regular rate and rhythm, normal S1 S2, no S3 or S4, no murmur, click or rub, no peripheral edema and peripheral pulses strong  ABDOMEN: soft, nontender, no hepatosplenomegaly, no masses and bowel sounds normal  MS: no gross musculoskeletal defects noted, no edema    ASA Classification: (I)  Normal healthy patient  Airway Exam: Mallampati Score: Class I (Complete visualization of soft palate)    Plan: Appropriate to proceed as scheduled.      Giovany Askew MD  5/18/2022    PCP:  Cheryl Reina    "

## 2022-10-03 ENCOUNTER — HEALTH MAINTENANCE LETTER (OUTPATIENT)
Age: 32
End: 2022-10-03

## 2023-02-12 ENCOUNTER — HEALTH MAINTENANCE LETTER (OUTPATIENT)
Age: 33
End: 2023-02-12

## 2023-04-20 ENCOUNTER — VIRTUAL VISIT (OUTPATIENT)
Dept: ONCOLOGY | Facility: CLINIC | Age: 33
End: 2023-04-20
Attending: CLINICAL NURSE SPECIALIST
Payer: COMMERCIAL

## 2023-04-20 VITALS — WEIGHT: 135 LBS | HEIGHT: 67 IN | BODY MASS INDEX: 21.19 KG/M2

## 2023-04-20 DIAGNOSIS — Z12.13 SCREENING FOR SMALL INTESTINE CANCER: ICD-10-CM

## 2023-04-20 DIAGNOSIS — Z12.6 SCREENING FOR MALIGNANT NEOPLASM OF BLADDER: ICD-10-CM

## 2023-04-20 DIAGNOSIS — Z12.11 SPECIAL SCREENING FOR MALIGNANT NEOPLASMS, COLON: ICD-10-CM

## 2023-04-20 DIAGNOSIS — Z80.41 FAMILY HISTORY OF MALIGNANT NEOPLASM OF OVARY: ICD-10-CM

## 2023-04-20 DIAGNOSIS — Z15.09 MSH6-RELATED LYNCH SYNDROME (HNPCC5): Primary | ICD-10-CM

## 2023-04-20 DIAGNOSIS — Z80.0 FAMILY HISTORY OF COLON CANCER: ICD-10-CM

## 2023-04-20 PROCEDURE — 99215 OFFICE O/P EST HI 40 MIN: CPT | Mod: VID | Performed by: CLINICAL NURSE SPECIALIST

## 2023-04-20 RX ORDER — BLOOD SUGAR DIAGNOSTIC
STRIP MISCELLANEOUS SEE ADMIN INSTRUCTIONS
COMMUNITY
Start: 2022-08-10

## 2023-04-20 RX ORDER — DOCUSATE SODIUM 100 MG/1
CAPSULE, LIQUID FILLED ORAL
COMMUNITY

## 2023-04-20 RX ORDER — COVID-19 ANTIGEN TEST
KIT MISCELLANEOUS
COMMUNITY

## 2023-04-20 ASSESSMENT — PAIN SCALES - GENERAL: PAINLEVEL: NO PAIN (0)

## 2023-04-20 NOTE — PATIENT INSTRUCTIONS
Individualized Surveillance Plan for Mayer Syndrome   (MSH6+ and PMS2+ mutation carriers)   Based on NCCN Guidelines Version 2.2022   Type of Screening Recommendation Last Done Next Due   Colon Cancer Screening Colonoscopy at age 30-35 years or 2-5 years prior to the earliest colon cancer in the family if it is diagnosed prior to age 30.     Repeat every 1-2 years.    5/18/2022- Colonoscopy, normal   November 2023 - EGD and colonoscopy combined   Endometrial and Ovarian Cancer Consider Prophylactic hysterectomy and bilateral salpingo-oophorectomy (BSO) can be considered by women who have completed childbearing.    Insufficient evidence exists to make specific recommendation for RRSO in MSH6+ and PMS2+ carriers. Ovarian cancer in paternal great aunt at about 52 Discuss THBSO around age 40-42    Screening using  endometrial sampling is an option every 1-2 years; women should be aware that dysfunctional uterine bleeding warrants evaluation.    Data do not support ovarian cancer screening for Mayer Syndrome. Annual transvaginal ultrasound and  tests may be considered at a clinician's discretion.   Consider endometrial biopsies   Consider Transvaginal US annually; ordered for November 2023   Gastric and small bowel cancer Upper GI screening every 2-4 years, starting at age 30 for MSH6+ carriers    PMS2+ carriers - Selected individuals or families or those of  descent may consider EGD with extended duodenoscopy every 3-5 years beginning at age 40.   None to date   November 2023 in conjunction with colonoscopy   Urothelial cancer Consider annual urinalysis at age 30-35 in MSH6+ families with family history of urothelial cancers 5/14/2022- normal Due now and repeat in one year   Pancreatic screening for MSH6+ with 1st or 2nd degree relatives with pancreatic cancer on Mayer side of family Annual contrast-enhanced MRI/MRCP and/or EUS, with consideration of shorter screening intervals for individuals found to have  worrisome abnormalities on screening.     Most small cystic lesions found on screening will not warrant biopsy, surgical resection, or any other intervention. No family history of pancreatic cancer Review at next visit   Prostate Screening  Annual PSA test with general population screening; may begin earlier if younger prostate cancers in the family   NA     NA   Central Nervous System cancer Annual physical/neurological examinations starting at age 25-30. 4/20/2023 April 2024       There are data to suggest that aspirin may decrease the risk of colon cancer in Mayer Syndrome.  However, optimal dose and duration of aspirin therapy is uncertain.    There have been suggestions that there is an increased risk for breast cancer in Mayer Syndrome patients; however, there is not enough evidence to support increased screening above average risk breast cancer screening.

## 2023-04-20 NOTE — LETTER
2023         RE: Julienne Matamoros  [address]  [facility] [address]        Dear Colleague,    Thank you for referring your patient, Julienne Matamoros, to the [facility]. Please see a copy of my visit note below.    Virtual Visit Details    Type of service:  Video Visit     Originating Location (pt. Location): Home  Distant Location (provider location):  Off-site  Platform used for Video Visit: [facility]     Oncology Risk Management Consultation:  Date on this visit: 2023    Julienne Matamoros requires high risk screening and surveillance to reduce her  risk of cancer secondary to MSH6+ associated Mayer Syndrome.     Primary Physician:  Daya Crane MD     History Of Present Illness:  Ms. Matamoros is a very pleasant, healthy 33 year old female who presents with MSH6+ associated Mayer Syndrome.     Genetic Testing:  3/31/2022-  POSITIVE for a pathogenic MSH6 mutation, specifically, c.3514dup (p.[id number]*5), identified using single site analysis through [employer].  This is the same mutation that was previously identified in her father.     Menarche at age 11  First child at age 31  Premenopausal.   Hx of  Breastfeedin months; currently breastfeeding her 2 month old son.  History of PCOS   Ovaries, fallopian tubes and uterus in place.    History of oral contraceptive use for about 8 years   No current form of birth control  No hx of hormone replacement therapy.    Hx of two  breast ultrasounds in the past few years due to palpable lumps; findings were benign.      Colonoscopy in 2017 for IBS work-up was unremarkable   - Colonoscopy: (Dr. Giovany Askew [facility]): The examined portion of the ileum was normal.                             - The entire examined colon is normal on direct and  retroflexion views.                             - No specimens collected.    Regular (every 1-2 years) dermatology visits for skin checks.      Review of  Systems:  GENERAL: No change in weight, sleep or appetite.  Normal energy.  No fever or chills  EYES: Negative for vision changes or eye problems  ENT: No problems with ears, nose or throat.  No difficulty swallowing.  RESP: No coughing, wheezing or shortness of breath  CV: No chest pains or palpitations  GI: No nausea, vomiting,  heartburn, abdominal pain, diarrhea, constipation or change in bowel habits  : No urinary frequency or dysuria, bladder or kidney problems  MUSCULOSKELETAL: No significant muscle or joint pains  NEUROLOGIC: No headaches, numbness, tingling, weakness, problems with balance or coordination  PSYCHIATRIC: No problems with anxiety, depression or mental health  HEME/IMMUNE/ALLERGY: No history of bleeding or clotting problems or anemia.  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    c.3514dup (p.[id number]*5)     Past Surgical History:   Procedure Laterality Date    COLONOSCOPY N/A 5/18/2022    Procedure: COLONOSCOPY;  Surgeon: Giovany Askew MD;  Location:  GI       Allergies:  Allergies as of 04/20/2023    (No Known Allergies)       Current Medications:  Current Outpatient Medications   Medication Sig Dispense Refill    Prenatal Vit-Fe Fumarate-FA (PRENATAL VITAMINS PO) Take 1 tablet by mouth daily      sertraline (ZOLOFT) 50 MG tablet       Cholecalciferol (VITAMIN D3) 1.25 MG (74542 UT) TABS Vitamin D3   4,000 IU      COVID-19 At Home Antigen Test KIT [facility] COVID-19 Antigen Rapid Home Test kit   USE AS DIRECTED      docusate sodium (STOOL SOFTENER) 100 MG capsule Stool Softener      [facility] COVID-19 RAPID TEST KIT See Admin Instructions      magnesium 100 MG CAPS magnesium          Family  History:  Family History   Problem Relation Age of Onset    Mayer Syndrome Father         MSH6+  c.3514dup (p.[id number]*5)    Lung Cancer Paternal Grandmother 85        hx of smoking    Prostate Cancer Paternal Grandfather 69    Colon Cancer Paternal Grandfather 72    Mayer Syndrome Paternal Grandfather         MSH6+    Skin Cancer Paternal Grandfather         melanoma, basal cell carcinoma, squamous cell carcinoma    Skin Cancer Paternal Aunt 47        melanoma, basal cell carcinoma, squamous cell carcinoma; hx of tanning bed use    Thrombocytopenia Paternal Aunt     Mayer Syndrome Paternal Aunt         MSH6+    Cervical Cancer Paternal Aunt 40    Other - See Comments Paternal Aunt         DOLLY exposure in utero    Mayer Syndrome Paternal Aunt         MSH6+    Esophageal Cancer Paternal Uncle 48         at 48    Cancer Other         jaw cancer; paternal grandfather's sister    Ovarian Cancer Other 54        paternal grandmother's sister    Cancer Other         multiple first cousins once removed:  breast (at age 54 and 60), skin cancers (BCC, SCC), non-Hodgkin's lymphoma, prostate cancer, and melanoma.       Social History:  Social History     Socioeconomic History    Marital status:      Spouse name: Babar    Number of children: 2    Years of education: 18    Highest education level: Master's degree (e.g., MA, MS, Ivy, MEd, MSW, DEVIKA)   Occupational History    Occupation: Infection Prevention Specialist     Employer: [employer]   Tobacco Use    Smoking status: Never    Smokeless tobacco: Never   Vaping Use    Vaping status: Not on file   Substance and Sexual Activity    Alcohol use: Yes     Comment: social    Drug use: Never    Sexual activity: Yes     Partners: Male     Birth control/protection: None   Other Topics Concern    Not on file   Social History Narrative    Not on file     Social Determinants of Health     Financial Resource Strain: Not on file   Food Insecurity: Not on file   Transportation  "Needs: Not on file   Physical Activity: Not on file   Stress: Not on file   Social Connections: Not on file   Intimate Partner Violence: Not on file   Housing Stability: Not on file       Physical Exam:  Ht 1.702 m (5' 7\")   Wt 61.2 kg (135 lb)   BMI 21.14 kg/m    GENERAL: Healthy, alert and no distress  EYES: Eyes grossly normal to inspection.  No discharge or erythema, or obvious scleral/conjunctival abnormalities.  RESP: No audible wheeze, cough, or visible cyanosis.  No visible retractions or increased work of breathing.    SKIN: Visible skin clear. No significant rash, abnormal pigmentation or lesions.  NEURO: Cranial nerves grossly intact.  Mentation and speech appropriate for age.  PSYCH: Mentation appears normal, affect normal/bright, judgement and insight intact, normal speech and appearance well-groomed.    Laboratory/Imaging Studies  No results found for any visits on 04/20/23.    ASSESSMENT  We reviewed her interval history; she is not getting much sleep these days with her 2 month old son, but her two year old son will be going back to  next week.  She has two more weeks of maternity leave left and intends to rest and be ready to go back to work after that.  She will continue her screening here in our program; she will be working for [employer].    We discussed that we could resume her GI and gynecological screening at the end of this year, in November or thereafter.  We discussed the potential endometrial screening with random biopsies, and she will discuss this her women's healthcare provider.  [facility] and will likely follow with her PCP or gynecologist.  I have ordered a transvaginal US to be done at the end of the year; we will defer  testing, as that is less effective in detecting endometrioid types of ovarian cancers, which are what we see with Mayer Syndrome. I have also ordered her a urine test to be done with cytology in November.         Site "  Estimated Average Age of Presentation for MSH6+ carriers Cumulative Risk for Diagnosis Through Age 80 Cumulative Risk for Diagnosis Through Lifetime for people in the general population      Colorectal    42-69 years   10-44%    4.2%    Endometrial 53-55 years   16-49%    3.1%      Ovarian    46 years   <1%-13%    1.3%      Renal pelvis and/or ureter    65-69 years   0.7-5.5%   Less than 1%      Bladder    71 years 1.0-8.5% 2.4%    Gastric  2 cases reported at ages 45 and 81 <1% -7.9% 0.9%    Small bowel 54 years   <1% - 4%    0.3%      Pancreas    No data   1.4-1.6%   1.6%      Biliary tract    No data   0.2-<1%    0.2%      Prostate    63 years   2.5-11.6%    11.6%    Breast (female)    No data 11.1-12.8%    12.8%      Brain    43-54 years    0.8-1.8%    0.6%      Individualized Surveillance Plan for Mayer Syndrome   (MSH6+ and PMS2+ mutation carriers)   Based on NCCN Guidelines Version 2.2022   Type of Screening Recommendation Last Done Next Due   Colon Cancer Screening Colonoscopy at age 30-35 years or 2-5 years prior to the earliest colon cancer in the family if it is diagnosed prior to age 30.     Repeat every 1-2 years.    5/18/2022- Colonoscopy, normal   November 2023 - EGD and colonoscopy combined   Endometrial and Ovarian Cancer Consider Prophylactic hysterectomy and bilateral salpingo-oophorectomy (BSO) can be considered by women who have completed childbearing.    Insufficient evidence exists to make specific recommendation for RRSO in MSH6+ and PMS2+ carriers. Ovarian cancer in paternal great aunt at about 52 Discuss THBSO around age 40-42    Screening using  endometrial sampling is an option every 1-2 years; women should be aware that dysfunctional uterine bleeding warrants evaluation.    Data do not support ovarian cancer screening for Mayer Syndrome. Annual transvaginal ultrasound and  tests may be considered at a clinician's discretion.   Consider endometrial biopsies   Consider Transvaginal  US annually; ordered for November 2023   Gastric and small bowel cancer Upper GI screening every 2-4 years, starting at age 30 for MSH6+ carriers    PMS2+ carriers - Selected individuals or families or those of  descent may consider EGD with extended duodenoscopy every 3-5 years beginning at age 40.   None to date   November 2023 in conjunction with colonoscopy   Urothelial cancer Consider annual urinalysis at age 30-35 in MSH6+ families with family history of urothelial cancers 5/14/2022- normal Due now and repeat in one year   Pancreatic screening for MSH6+ with 1st or 2nd degree relatives with pancreatic cancer on Mayer side of family Annual contrast-enhanced MRI/MRCP and/or EUS, with consideration of shorter screening intervals for individuals found to have worrisome abnormalities on screening.     Most small cystic lesions found on screening will not warrant biopsy, surgical resection, or any other intervention. No family history of pancreatic cancer Review at next visit   Prostate Screening  Annual PSA test with general population screening; may begin earlier if younger prostate cancers in the family   NA     NA   Central Nervous System cancer Annual physical/neurological examinations starting at age 25-30. 4/20/2023 April 2024       There are data to suggest that aspirin may decrease the risk of colon cancer in Mayer Syndrome.  However, optimal dose and duration of aspirin therapy is uncertain.    There have been suggestions that there is an increased risk for breast cancer in Mayer Syndrome patients; however, there is not enough evidence to support increased screening above average risk breast cancer screening.     I spent a total of 50 minutes on the day of the visit. Please see the note for further information on patient assessment and treatment.    Luz Maria Irving, APRN-CNS, OCN, AGN-BC  Clinical Nurse Specialist  Cancer Risk Management Program  [facility]    CC: Daya Crane,  MD

## 2023-04-20 NOTE — PROGRESS NOTES
Virtual Visit Details    Type of service:  Video Visit     Originating Location (pt. Location): Home  Distant Location (provider location):  Off-site  Platform used for Video Visit: [facility]     Oncology Risk Management Consultation:  Date on this visit: 2023    Julienne Matamoros requires high risk screening and surveillance to reduce her  risk of cancer secondary to MSH6+ associated Mayer Syndrome.     Primary Physician:  Daya Crane MD     History Of Present Illness:  Ms. Matamoros is a very pleasant, healthy 33 year old female who presents with MSH6+ associated Mayer Syndrome.     Genetic Testing:  3/31/2022-  POSITIVE for a pathogenic MSH6 mutation, specifically, c.3514dup (p.[id number]*5), identified using single site analysis through [employer].  This is the same mutation that was previously identified in her father.     Menarche at age 11  First child at age 31  Premenopausal.   Hx of  Breastfeedin months; currently breastfeeding her 2 month old son.  History of PCOS   Ovaries, fallopian tubes and uterus in place.    History of oral contraceptive use for about 8 years   No current form of birth control  No hx of hormone replacement therapy.    Hx of two  breast ultrasounds in the past few years due to palpable lumps; findings were benign.      Colonoscopy in 2017 for IBS work-up was unremarkable   - [facility] The examined portion of the ileum was normal.                             - The entire examined colon is normal on direct and  retroflexion views.                             - No specimens collected.    Regular (every 1-2 years) dermatology visits for skin checks.      Review of Systems:  GENERAL: No change in weight, sleep or appetite.  Normal energy.  No fever or chills  EYES: Negative for vision changes or eye problems  ENT: No problems with ears, nose or throat.  No difficulty swallowing.  RESP: No coughing, wheezing or shortness of  breath  CV: No chest pains or palpitations  GI: No nausea, vomiting,  heartburn, abdominal pain, diarrhea, constipation or change in bowel habits  : No urinary frequency or dysuria, bladder or kidney problems  MUSCULOSKELETAL: No significant muscle or joint pains  NEUROLOGIC: No headaches, numbness, tingling, weakness, problems with balance or coordination  PSYCHIATRIC: No problems with anxiety, depression or mental health  HEME/IMMUNE/ALLERGY: No history of bleeding or clotting problems or anemia.  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    c.3514dup (p.[id number]*5)     Past Surgical History:   Procedure Laterality Date     COLONOSCOPY N/A 5/18/2022    Procedure: COLONOSCOPY;  Surgeon: Giovany Askew MD;  Location:  GI       Allergies:  Allergies as of 04/20/2023     (No Known Allergies)       Current Medications:  Current Outpatient Medications   Medication Sig Dispense Refill     Prenatal Vit-Fe Fumarate-FA (PRENATAL VITAMINS PO) Take 1 tablet by mouth daily       sertraline (ZOLOFT) 50 MG tablet        Cholecalciferol (VITAMIN D3) 1.25 MG (11306 UT) TABS Vitamin D3   4,000 IU       COVID-19 At Home Antigen Test KIT [facility] COVID-19 Antigen Rapid Home Test kit   USE AS DIRECTED       docusate sodium (STOOL SOFTENER) 100 MG capsule Stool Softener       [facility] COVID-19 RAPID TEST KIT See Admin Instructions       magnesium 100 MG CAPS magnesium          Family History:  Family History   Problem Relation Age of Onset     Mayer Syndrome Father         MSH6+  c.3514dup (p.[id number]*5)     Lung Cancer Paternal Grandmother 85        hx of smoking     Prostate Cancer Paternal Grandfather 69     Colon Cancer Paternal  "Grandfather 72     Mayer Syndrome Paternal Grandfather         MSH6+     Skin Cancer Paternal Grandfather         melanoma, basal cell carcinoma, squamous cell carcinoma     Skin Cancer Paternal Aunt 47        melanoma, basal cell carcinoma, squamous cell carcinoma; hx of tanning bed use     Thrombocytopenia Paternal Aunt      Mayer Syndrome Paternal Aunt         MSH6+     Cervical Cancer Paternal Aunt 40     Other - See Comments Paternal Aunt         DOLLY exposure in utero     Mayer Syndrome Paternal Aunt         MSH6+     Esophageal Cancer Paternal Uncle 48         at 48     Cancer Other         jaw cancer; paternal grandfather's sister     Ovarian Cancer Other 54        paternal grandmother's sister     Cancer Other         multiple first cousins once removed:  breast (at age 54 and 60), skin cancers (BCC, SCC), non-Hodgkin's lymphoma, prostate cancer, and melanoma.       Social History:  Social History     Socioeconomic History     Marital status:      Spouse name: Babar     Number of children: 2     Years of education: 18     Highest education level: Master's degree (e.g., MA, MS, Ivy, MEd, MSW, DEVIKA)   Occupational History     Occupation: Infection Prevention Specialist     Employer: [employer]   Tobacco Use     Smoking status: Never     Smokeless tobacco: Never   Vaping Use     Vaping status: Not on file   Substance and Sexual Activity     Alcohol use: Yes     Comment: social     Drug use: Never     Sexual activity: Yes     Partners: Male     Birth control/protection: None   Other Topics Concern     Not on file   Social History Narrative     Not on file     Social Determinants of Health     Financial Resource Strain: Not on file   Food Insecurity: Not on file   Transportation Needs: Not on file   Physical Activity: Not on file   Stress: Not on file   Social Connections: Not on file   Intimate Partner Violence: Not on file   Housing Stability: Not on file       Physical Exam:  Ht 1.702 m (5' 7\")   Wt " 61.2 kg (135 lb)   BMI 21.14 kg/m    GENERAL: Healthy, alert and no distress  EYES: Eyes grossly normal to inspection.  No discharge or erythema, or obvious scleral/conjunctival abnormalities.  RESP: No audible wheeze, cough, or visible cyanosis.  No visible retractions or increased work of breathing.    SKIN: Visible skin clear. No significant rash, abnormal pigmentation or lesions.  NEURO: Cranial nerves grossly intact.  Mentation and speech appropriate for age.  PSYCH: Mentation appears normal, affect normal/bright, judgement and insight intact, normal speech and appearance well-groomed.    Laboratory/Imaging Studies  No results found for any visits on 04/20/23.    ASSESSMENT  We reviewed her interval history; she is not getting much sleep these days with her 2 month old son, but her two year old son will be going back to  next week.  She has two more weeks of maternity leave left and intends to rest and be ready to go back to work after that.  She will continue her screening here in our program; she will be working for [employer].    We discussed that we could resume her GI and gynecological screening at the end of this year, in November or thereafter.  We discussed the potential endometrial screening with random biopsies, and she will discuss this her women's healthcare provider.  [facility] and will likely follow with her PCP or gynecologist.  I have ordered a transvaginal US to be done at the end of the year; we will defer  testing, as that is less effective in detecting endometrioid types of ovarian cancers, which are what we see with Mayer Syndrome. I have also ordered her a urine test to be done with cytology in November.         Site  Estimated Average Age of Presentation for MSH6+ carriers Cumulative Risk for Diagnosis Through Age 80 Cumulative Risk for Diagnosis Through Lifetime for people in the general population      Colorectal    42-69 years   10-44%     4.2%    Endometrial 53-55 years   16-49%    3.1%      Ovarian    46 years   <1%-13%    1.3%      Renal pelvis and/or ureter    65-69 years   0.7-5.5%   Less than 1%      Bladder    71 years 1.0-8.5% 2.4%    Gastric  2 cases reported at ages 45 and 81 <1% -7.9% 0.9%    Small bowel 54 years   <1% - 4%    0.3%      Pancreas    No data   1.4-1.6%   1.6%      Biliary tract    No data   0.2-<1%    0.2%      Prostate    63 years   2.5-11.6%    11.6%    Breast (female)    No data 11.1-12.8%    12.8%      Brain    43-54 years    0.8-1.8%    0.6%      Individualized Surveillance Plan for Mayer Syndrome   (MSH6+ and PMS2+ mutation carriers)   Based on NCCN Guidelines Version 2.2022   Type of Screening Recommendation Last Done Next Due   Colon Cancer Screening Colonoscopy at age 30-35 years or 2-5 years prior to the earliest colon cancer in the family if it is diagnosed prior to age 30.     Repeat every 1-2 years.    5/18/2022- Colonoscopy, normal   November 2023 - EGD and colonoscopy combined   Endometrial and Ovarian Cancer Consider Prophylactic hysterectomy and bilateral salpingo-oophorectomy (BSO) can be considered by women who have completed childbearing.    Insufficient evidence exists to make specific recommendation for RRSO in MSH6+ and PMS2+ carriers. Ovarian cancer in paternal great aunt at about 52 Discuss THBSO around age 40-42    Screening using  endometrial sampling is an option every 1-2 years; women should be aware that dysfunctional uterine bleeding warrants evaluation.    Data do not support ovarian cancer screening for Mayer Syndrome. Annual transvaginal ultrasound and  tests may be considered at a clinician's discretion.   Consider endometrial biopsies   Consider Transvaginal US annually; ordered for November 2023   Gastric and small bowel cancer Upper GI screening every 2-4 years, starting at age 30 for MSH6+ carriers    PMS2+ carriers - Selected individuals or families or those of  descent may  consider EGD with extended duodenoscopy every 3-5 years beginning at age 40.   None to date   November 2023 in conjunction with colonoscopy   Urothelial cancer Consider annual urinalysis at age 30-35 in MSH6+ families with family history of urothelial cancers 5/14/2022- normal Due now and repeat in one year   Pancreatic screening for MSH6+ with 1st or 2nd degree relatives with pancreatic cancer on Mayer side of family Annual contrast-enhanced MRI/MRCP and/or EUS, with consideration of shorter screening intervals for individuals found to have worrisome abnormalities on screening.     Most small cystic lesions found on screening will not warrant biopsy, surgical resection, or any other intervention. No family history of pancreatic cancer Review at next visit   Prostate Screening  Annual PSA test with general population screening; may begin earlier if younger prostate cancers in the family   NA     NA   Central Nervous System cancer Annual physical/neurological examinations starting at age 25-30. 4/20/2023 April 2024       There are data to suggest that aspirin may decrease the risk of colon cancer in Mayer Syndrome.  However, optimal dose and duration of aspirin therapy is uncertain.    There have been suggestions that there is an increased risk for breast cancer in Mayer Syndrome patients; however, there is not enough evidence to support increased screening above average risk breast cancer screening.     I spent a total of 50 minutes on the day of the visit. Please see the note for further information on patient assessment and treatment.    Luz Maria Irving, AI-CNS, OCN, AGN-BC  Clinical Nurse Specialist  Cancer Risk Management Program  [email or web address]    CC: Daya Crane MD

## 2023-04-20 NOTE — NURSING NOTE
Is the patient currently in the state of [place]? YES    Visit mode:VIDEO    If the visit is dropped, the patient can be reconnected by: VIDEO VISIT: Text to cell phone: [phone]    Will anyone else be joining the visit? NO      How would you like to obtain your AVS? MyChart    Are changes needed to the allergy or medication list? NO    Reason for visit: Video Visit      Paulino Lawson

## 2023-11-03 ENCOUNTER — TELEPHONE (OUTPATIENT)
Dept: GASTROENTEROLOGY | Facility: CLINIC | Age: 33
End: 2023-11-03
Payer: COMMERCIAL

## 2023-11-03 DIAGNOSIS — K58.9 IBS (IRRITABLE BOWEL SYNDROME): Primary | ICD-10-CM

## 2023-11-03 NOTE — TELEPHONE ENCOUNTER
"Endoscopy Scheduling Screen    Have you had a positive Covid test in the last 14 days?  No    Are you active on MyChart?   Yes    What insurance is in the chart?  Other:  [employer]     Ordering/Referring Provider: PRATIBHA GUERRERO    (If ordering provider performs procedure, schedule with ordering provider unless otherwise instructed. )    BMI: Estimated body mass index is 21.14 kg/m  as calculated from the following:    Height as of 4/20/23: 1.702 m (5' 7\").    Weight as of 4/20/23: 61.2 kg (135 lb).     Sedation Ordered  moderate sedation.   If patient BMI > 50 do not schedule in [facility].    If patient BMI > 45 do not schedule at [facility].    Are you taking methadone or Suboxone?  No    Are you taking any prescription medications for pain 3 or more times per week?   No    Do you have a history of malignant hyperthermia or adverse reaction to anesthesia?  No    (Females) Are you currently pregnant?   No     Have you been diagnosed or told you have pulmonary hypertension?   No    Do you have an LVAD?  No    Have you been told you have moderate to severe sleep apnea?  No    Have you been told you have COPD, asthma, or any other lung disease?  No    Do you have any heart conditions?  No     Have you ever had an organ transplant?   No    Have you ever had or are you awaiting a heart or lung transplant?   No    Have you had a stroke or transient ischemic attack (TIA aka \"mini stroke\" in the last 6 months?   No    Have you been diagnosed with or been told you have cirrhosis of the liver?   No    Are you currently on dialysis?   No    Do you need assistance transferring?   No    BMI: Estimated body mass index is 21.14 kg/m  as calculated from the following:    Height as of 4/20/23: 1.702 m (5' 7\").    Weight as of 4/20/23: 61.2 kg (135 lb).     Is patients BMI > 40 and scheduling location [facility]?  No    Do you take an injectable medication for weight loss or diabetes (excluding insulin)?  No    Do you take the medication " Naltrexone?  No    Do you take blood thinners?  No       Prep   Are you currently on dialysis or do you have chronic kidney disease?  No    Do you have a diagnosis of diabetes?  No    Do you have a diagnosis of cystic fibrosis (CF)?  No    On a regular basis do you go 3 -5 days between bowel movements?  No    BMI > 40?  No    Preferred Pharmacy:    [facility] - [place], [place] - [address]  [address]  [place]   [facility] [address]  Phone: [phone] Fax: [phone]      Final Scheduling Details   Colonoscopy prep sent?  Standard MiraLAX    Procedure scheduled  Colonoscopy / Upper endoscopy (EGD)    Surgeon:  ORAL      Date of procedure:  11/10/2023     Pre-OP / PAC:   No - Not required for this site.    Location  [facility] - [facility] - Patient preference.    Sedation   Moderate Sedation - Per order.      Patient Reminders:   You will receive a call from a Nurse to review instructions and health history.  This assessment must be completed prior to your procedure.  Failure to complete the Nurse assessment may result in the procedure being cancelled.      On the day of your procedure, please designate an adult(s) who can drive you home stay with you for the next 24 hours. The medicines used in the exam will make you sleepy. You will not be able to drive.      You cannot take public transportation, ride share services, or non-medical taxi service without a responsible caregiver.  Medical transport services are allowed with the requirement that a responsible caregiver will receive you at your destination.  We require that drivers and caregivers are confirmed prior to your procedure.

## 2023-11-03 NOTE — TELEPHONE ENCOUNTER
Pre assessment completed for upcoming procedure.      Procedure details:    Patient scheduled for Colonoscopy/Upper endoscopy (EGD) on 11/10/23.     Arrival time: 0715. Procedure time 0815    Pre op exam needed? 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    Designated  policy reviewed. Instructed to have someone stay 6 hours post procedure.       Chart review:     Electronic implanted devices? No    Recent diagnosis of diverticulitis within the last 6 weeks?  No    Diabetic? No    Diabetic medication HOLDING recommendations: (if applicable)  Oral diabetic medications: No  Diabetic injectables: No  Insulin: No    Medication review:    Anticoagulants? No    NSAIDS? No    Other medication HOLDING recommendations:  N/A      Prep for procedure:     Bowel prep recommendation: Standard Miralax or SuTab  Due to: standard bowel prep.    Pt wanted SuTab called in- if it is too expensive she will do the miralax prep    Reviewed both preps with the pt and sent   Prep instructions sent via [facility]     Pt also asked about breast feeding while prepping - instructed pt to consult with the pharmacist and or her OBGYN for instructions.    Reviewed procedure prep instructions.     Patient verbalized understanding and had no questions or concerns at this time.        Bridget Masters RN  Endoscopy Procedure Pre Assessment RN  [phone] option 4

## 2023-11-07 ENCOUNTER — LAB (OUTPATIENT)
Dept: LAB | Facility: CLINIC | Age: 33
End: 2023-11-07
Attending: CLINICAL NURSE SPECIALIST
Payer: COMMERCIAL

## 2023-11-07 ENCOUNTER — ANCILLARY PROCEDURE (OUTPATIENT)
Dept: ULTRASOUND IMAGING | Facility: CLINIC | Age: 33
End: 2023-11-07
Attending: CLINICAL NURSE SPECIALIST
Payer: COMMERCIAL

## 2023-11-07 DIAGNOSIS — Z12.6 SCREENING FOR MALIGNANT NEOPLASM OF BLADDER: ICD-10-CM

## 2023-11-07 DIAGNOSIS — Z15.09 MSH6-RELATED LYNCH SYNDROME (HNPCC5): ICD-10-CM

## 2023-11-07 DIAGNOSIS — Z80.41 FAMILY HISTORY OF MALIGNANT NEOPLASM OF OVARY: ICD-10-CM

## 2023-11-07 LAB
ALBUMIN UR-MCNC: 20 MG/DL
APPEARANCE UR: CLEAR
BACTERIA #/AREA URNS HPF: ABNORMAL /HPF
BILIRUB UR QL STRIP: NEGATIVE
COLOR UR AUTO: YELLOW
GLUCOSE UR STRIP-MCNC: NEGATIVE MG/DL
HGB UR QL STRIP: NEGATIVE
KETONES UR STRIP-MCNC: NEGATIVE MG/DL
LEUKOCYTE ESTERASE UR QL STRIP: ABNORMAL
MUCOUS THREADS #/AREA URNS LPF: PRESENT /LPF
NITRATE UR QL: NEGATIVE
PH UR STRIP: 6.5 [PH] (ref 5–7)
RBC URINE: 1 /HPF
SP GR UR STRIP: 1.03 (ref 1–1.03)
SQUAMOUS EPITHELIAL: 5 /HPF
TRANSITIONAL EPI: 1 /HPF
UROBILINOGEN UR STRIP-MCNC: NORMAL MG/DL
WBC URINE: 1 /HPF

## 2023-11-07 PROCEDURE — 81001 URINALYSIS AUTO W/SCOPE: CPT

## 2023-11-07 PROCEDURE — 88112 CYTOPATH CELL ENHANCE TECH: CPT | Mod: TC

## 2023-11-07 PROCEDURE — 76830 TRANSVAGINAL US NON-OB: CPT

## 2023-11-07 PROCEDURE — 88112 CYTOPATH CELL ENHANCE TECH: CPT | Mod: 26 | Performed by: PATHOLOGY

## 2023-11-08 LAB
PATH REPORT.COMMENTS IMP SPEC: NORMAL
PATH REPORT.FINAL DX SPEC: NORMAL
PATH REPORT.GROSS SPEC: NORMAL
PATH REPORT.MICROSCOPIC SPEC OTHER STN: NORMAL
PATH REPORT.RELEVANT HX SPEC: NORMAL

## 2023-11-10 ENCOUNTER — HOSPITAL ENCOUNTER (OUTPATIENT)
Facility: AMBULATORY SURGERY CENTER | Age: 33
Discharge: HOME | End: 2023-11-10
Attending: INTERNAL MEDICINE | Admitting: INTERNAL MEDICINE
Payer: COMMERCIAL

## 2023-11-10 ENCOUNTER — ANESTHESIA EVENT (OUTPATIENT)
Dept: SURGERY | Facility: AMBULATORY SURGERY CENTER | Age: 33
End: 2023-11-10
Payer: COMMERCIAL

## 2023-11-10 ENCOUNTER — ANESTHESIA (OUTPATIENT)
Dept: SURGERY | Facility: AMBULATORY SURGERY CENTER | Age: 33
End: 2023-11-10
Payer: COMMERCIAL

## 2023-11-10 VITALS
BODY MASS INDEX: 21.19 KG/M2 | DIASTOLIC BLOOD PRESSURE: 68 MMHG | WEIGHT: 135 LBS | OXYGEN SATURATION: 100 % | RESPIRATION RATE: 16 BRPM | HEIGHT: 67 IN | SYSTOLIC BLOOD PRESSURE: 98 MMHG | TEMPERATURE: 97 F | HEART RATE: 79 BPM

## 2023-11-10 VITALS — HEART RATE: 78 BPM

## 2023-11-10 LAB
COLONOSCOPY: NORMAL
HCG UR QL: NEGATIVE
INTERNAL QC OK POCT: NORMAL
POCT KIT EXPIRATION DATE: NORMAL
POCT KIT LOT NUMBER: NORMAL
SMALL BOWEL ENTEROSCOPY: NORMAL

## 2023-11-10 PROCEDURE — 88305 TISSUE EXAM BY PATHOLOGIST: CPT | Mod: 26 | Performed by: PATHOLOGY

## 2023-11-10 PROCEDURE — 44364 SMALL BOWEL ENDOSCOPY: CPT | Performed by: INTERNAL MEDICINE

## 2023-11-10 PROCEDURE — 45378 DIAGNOSTIC COLONOSCOPY: CPT | Mod: 33 | Performed by: INTERNAL MEDICINE

## 2023-11-10 PROCEDURE — 44361 SMALL BOWEL ENDOSCOPY/BIOPSY: CPT | Mod: 59 | Performed by: INTERNAL MEDICINE

## 2023-11-10 PROCEDURE — 88305 TISSUE EXAM BY PATHOLOGIST: CPT | Mod: TC | Performed by: INTERNAL MEDICINE

## 2023-11-10 PROCEDURE — 81025 URINE PREGNANCY TEST: CPT | Performed by: PATHOLOGY

## 2023-11-10 RX ORDER — NALOXONE HYDROCHLORIDE 0.4 MG/ML
0.4 INJECTION, SOLUTION INTRAMUSCULAR; INTRAVENOUS; SUBCUTANEOUS
Status: DISCONTINUED | OUTPATIENT
Start: 2023-11-10 | End: 2023-11-11 | Stop reason: HOSPADM

## 2023-11-10 RX ORDER — ONDANSETRON 2 MG/ML
4 INJECTION INTRAMUSCULAR; INTRAVENOUS
Status: DISCONTINUED | OUTPATIENT
Start: 2023-11-10 | End: 2023-11-11 | Stop reason: HOSPADM

## 2023-11-10 RX ORDER — NALOXONE HYDROCHLORIDE 0.4 MG/ML
0.2 INJECTION, SOLUTION INTRAMUSCULAR; INTRAVENOUS; SUBCUTANEOUS
Status: DISCONTINUED | OUTPATIENT
Start: 2023-11-10 | End: 2023-11-11 | Stop reason: HOSPADM

## 2023-11-10 RX ORDER — ONDANSETRON 4 MG/1
4 TABLET, ORALLY DISINTEGRATING ORAL EVERY 6 HOURS PRN
Status: DISCONTINUED | OUTPATIENT
Start: 2023-11-10 | End: 2023-11-11 | Stop reason: HOSPADM

## 2023-11-10 RX ORDER — PROPOFOL 10 MG/ML
INJECTION, EMULSION INTRAVENOUS PRN
Status: DISCONTINUED | OUTPATIENT
Start: 2023-11-10 | End: 2023-11-10

## 2023-11-10 RX ORDER — PROPOFOL 10 MG/ML
INJECTION, EMULSION INTRAVENOUS CONTINUOUS PRN
Status: DISCONTINUED | OUTPATIENT
Start: 2023-11-10 | End: 2023-11-10

## 2023-11-10 RX ORDER — FLUMAZENIL 0.1 MG/ML
0.2 INJECTION, SOLUTION INTRAVENOUS
Status: ACTIVE | OUTPATIENT
Start: 2023-11-10 | End: 2023-11-10

## 2023-11-10 RX ORDER — LIDOCAINE 40 MG/G
CREAM TOPICAL
Status: DISCONTINUED | OUTPATIENT
Start: 2023-11-10 | End: 2023-11-11 | Stop reason: HOSPADM

## 2023-11-10 RX ORDER — GLYCOPYRROLATE 0.2 MG/ML
INJECTION, SOLUTION INTRAMUSCULAR; INTRAVENOUS PRN
Status: DISCONTINUED | OUTPATIENT
Start: 2023-11-10 | End: 2023-11-10

## 2023-11-10 RX ORDER — ONDANSETRON 2 MG/ML
4 INJECTION INTRAMUSCULAR; INTRAVENOUS EVERY 6 HOURS PRN
Status: DISCONTINUED | OUTPATIENT
Start: 2023-11-10 | End: 2023-11-11 | Stop reason: HOSPADM

## 2023-11-10 RX ORDER — PROCHLORPERAZINE MALEATE 10 MG
10 TABLET ORAL EVERY 6 HOURS PRN
Status: DISCONTINUED | OUTPATIENT
Start: 2023-11-10 | End: 2023-11-11 | Stop reason: HOSPADM

## 2023-11-10 RX ORDER — SODIUM CHLORIDE, SODIUM LACTATE, POTASSIUM CHLORIDE, CALCIUM CHLORIDE 600; 310; 30; 20 MG/100ML; MG/100ML; MG/100ML; MG/100ML
INJECTION, SOLUTION INTRAVENOUS CONTINUOUS PRN
Status: DISCONTINUED | OUTPATIENT
Start: 2023-11-10 | End: 2023-11-10

## 2023-11-10 RX ORDER — LIDOCAINE HYDROCHLORIDE 20 MG/ML
INJECTION, SOLUTION INFILTRATION; PERINEURAL PRN
Status: DISCONTINUED | OUTPATIENT
Start: 2023-11-10 | End: 2023-11-10

## 2023-11-10 RX ADMIN — PROPOFOL 200 MCG/KG/MIN: 10 INJECTION, EMULSION INTRAVENOUS at 08:22

## 2023-11-10 RX ADMIN — LIDOCAINE HYDROCHLORIDE 60 MG: 20 INJECTION, SOLUTION INFILTRATION; PERINEURAL at 08:21

## 2023-11-10 RX ADMIN — PROPOFOL 150 MCG/KG/MIN: 10 INJECTION, EMULSION INTRAVENOUS at 08:50

## 2023-11-10 RX ADMIN — PROPOFOL 30 MG: 10 INJECTION, EMULSION INTRAVENOUS at 08:33

## 2023-11-10 RX ADMIN — PROPOFOL 40 MG: 10 INJECTION, EMULSION INTRAVENOUS at 08:25

## 2023-11-10 RX ADMIN — PROPOFOL 40 MG: 10 INJECTION, EMULSION INTRAVENOUS at 08:23

## 2023-11-10 RX ADMIN — PROPOFOL 30 MG: 10 INJECTION, EMULSION INTRAVENOUS at 08:39

## 2023-11-10 RX ADMIN — GLYCOPYRROLATE 0.2 MG: 0.2 INJECTION, SOLUTION INTRAMUSCULAR; INTRAVENOUS at 08:21

## 2023-11-10 RX ADMIN — PROPOFOL 30 MG: 10 INJECTION, EMULSION INTRAVENOUS at 08:26

## 2023-11-10 RX ADMIN — SODIUM CHLORIDE, SODIUM LACTATE, POTASSIUM CHLORIDE, CALCIUM CHLORIDE: 600; 310; 30; 20 INJECTION, SOLUTION INTRAVENOUS at 08:19

## 2023-11-10 ASSESSMENT — ENCOUNTER SYMPTOMS: SEIZURES: 1

## 2023-11-10 NOTE — ANESTHESIA PREPROCEDURE EVALUATION
"Anesthesia Pre-Procedure Evaluation    Patient: Julienne Matamoros   MRN: [id number] : 1990        Procedure : Procedure(s):  Colonoscopy  Esophagoscopy, gastroscopy, duodenoscopy (EGD), combined WITH DUODENUM POLYPECTOMY          Past Medical History:   Diagnosis Date    Depressive disorder     MSH6-related Mayer syndrome (HNPCC5) 2022    c.3514dup (p.[id number]*5)      Past Surgical History:   Procedure Laterality Date    COLONOSCOPY N/A 2022    Procedure: COLONOSCOPY;  Surgeon: Giovany Askew MD;  Location:  GI      No Known Allergies   Social History     Tobacco Use    Smoking status: Never    Smokeless tobacco: Never   Substance Use Topics    Alcohol use: Yes     Comment: social      Wt Readings from Last 1 Encounters:   11/10/23 61.2 kg (135 lb)        Anesthesia Evaluation   Pt has had prior anesthetic.     No history of anesthetic complications       ROS/MED HX  ENT/Pulmonary:       Neurologic:     (+)       seizures,                         Cardiovascular:       METS/Exercise Tolerance:     Hematologic:       Musculoskeletal:       GI/Hepatic: Comment: Mayer syndrome      Renal/Genitourinary:       Endo:       Psychiatric/Substance Use:     (+) psychiatric history depression and anxiety       Infectious Disease:       Malignancy:       Other:            Physical Exam    Airway        Mallampati: I   TM distance: > 3 FB   Neck ROM: full   Mouth opening: > 3 cm    Respiratory Devices and Support         Dental       (+) Minor Abnormalities - some fillings, tiny chips      Cardiovascular             Pulmonary                   OUTSIDE LABS:  CBC: No results found for: \"WBC\", \"HGB\", \"HCT\", \"PLT\"  BMP: No results found for: \"NA\", \"POTASSIUM\", \"CHLORIDE\", \"CO2\", \"BUN\", \"CR\", \"GLC\"  COAGS: No results found for: \"PTT\", \"INR\", \"FIBR\"  POC:   Lab Results   Component Value Date    HCG Negative 11/10/2023     HEPATIC: No results found for: \"ALBUMIN\", \"PROTTOTAL\", \"ALT\", \"AST\", \"GGT\", " "\"ALKPHOS\", \"BILITOTAL\", \"BILIDIRECT\", \"LEXY\"  OTHER: No results found for: \"PH\", \"LACT\", \"A1C\", \"RANDAL\", \"PHOS\", \"MAG\", \"LIPASE\", \"AMYLASE\", \"TSH\", \"T4\", \"T3\", \"CRP\", \"SED\"    Anesthesia Plan    ASA Status:  2    NPO Status:  NPO Appropriate    Anesthesia Type: MAC.   Induction: Intravenous, Propofol.   Maintenance: TIVA.        Consents    Anesthesia Plan(s) and associated risks, benefits, and realistic alternatives discussed. Questions answered and patient/representative(s) expressed understanding.     - Discussed:     - Discussed with:  Patient      - Extended Intubation/Ventilatory Support Discussed: No.      - Patient is DNR/DNI Status: No          Postoperative Care       PONV prophylaxis: Background Propofol Infusion     Comments:                Johnathan Vidal MD  "

## 2023-11-10 NOTE — H&P
Julienne Matamoros  [id number]  female  33 year old      Reason for procedure/surgery:  Mayer syndrome, EGD/colonoscopy for colon cancer screening and duodenal surveillance      Patient Active Problem List   Diagnosis    MSH6-related Mayer syndrome (HNPCC5)    Generalized anxiety disorder    IBS (irritable bowel syndrome)    Muscle weakness    Seizure (H)       Past Surgical History:    Past Surgical History:   Procedure Laterality Date    COLONOSCOPY N/A 2022    Procedure: COLONOSCOPY;  Surgeon: Giovany Askew MD;  Location:  GI       Past Medical History:   Past Medical History:   Diagnosis Date    Depressive disorder     MSH6-related Mayer syndrome (HNPCC5) 2022    c.3514dup (p.[id number]*5)       Social History:   Social History     Tobacco Use    Smoking status: Never    Smokeless tobacco: Never   Substance Use Topics    Alcohol use: Yes     Comment: social       Family History:   Family History   Problem Relation Age of Onset    Mayer Syndrome Father         MSH6+  c.3514dup (p.[id number]*5)    Lung Cancer Paternal Grandmother 85        hx of smoking    Prostate Cancer Paternal Grandfather 69    Colon Cancer Paternal Grandfather 72    Mayer Syndrome Paternal Grandfather         MSH6+    Skin Cancer Paternal Grandfather         melanoma, basal cell carcinoma, squamous cell carcinoma    Skin Cancer Paternal Aunt 47        melanoma, basal cell carcinoma, squamous cell carcinoma; hx of tanning bed use    Thrombocytopenia Paternal Aunt     Mayer Syndrome Paternal Aunt         MSH6+    Cervical Cancer Paternal Aunt 40    Other - See Comments Paternal Aunt         DOLLY exposure in utero    Mayer Syndrome Paternal Aunt         MSH6+    Esophageal Cancer Paternal Uncle 48         at 48    Cancer Other         jaw cancer; paternal grandfather's sister    Ovarian Cancer Other 54        paternal grandmother's sister    Cancer Other         multiple first cousins once removed:  breast (at age 54  and 60), skin cancers (BCC, SCC), non-Hodgkin's lymphoma, prostate cancer, and melanoma.       Allergies: No Known Allergies    Active Medications:   Current Outpatient Medications   Medication Sig Dispense Refill    Cholecalciferol (VITAMIN D3) 1.25 MG (44640 UT) TABS Vitamin D3   4,000 IU      COVID-19 At Home Antigen Test KIT [facility] COVID-19 Antigen Rapid Home Test kit   USE AS DIRECTED      [facility] COVID-19 RAPID TEST KIT See Admin Instructions      Prenatal Vit-Fe Fumarate-FA (PRENATAL VITAMINS PO) Take 1 tablet by mouth daily      sertraline (ZOLOFT) 50 MG tablet       docusate sodium (STOOL SOFTENER) 100 MG capsule Stool Softener      Sodium Sulfate-Mag Sulfate-KCl 9980-710-776 MG TABS Take 12 tablets by mouth 2 times daily Take as directed. Step 1: At 4 pm, open one bottle of 12 tablets Step 2: Fill the provided container with 16 ounces of water (up to the fill line). Swallow 1 tablet every 1 to 2 minutes, you should finish the 12 tablets and the entire 16 ounces of water within 20 minutes.  Step 3: Approximately 1 hour after the last tablet is ingested, fill the provided container again with 16 ounces of water (up to the fill line), and drink the entire amount over 30 minutes. Step 4: Approximately 30 minutes after finishing the second container of water, fill the provided container with 16 ounces of water (up to the fill ine, and drink the entire amount over 30 minutes. If you arrive for your procedure before 11 AM - At 8 PM, open the second bottle of 12 tablets.  Repeat step 1 to step 4 again. If you arrive for your procedure after 11 AM -At 6 AM on the day of the exam open the second bottle of 12 tablets.  Repeat step 1 to step 4 again. 24 tablet 0       Systemic Review:   CONSTITUTIONAL: NEGATIVE for fever, chills, change in weight  ENT/MOUTH: NEGATIVE for ear, mouth and throat problems  RESP: NEGATIVE for significant cough or SOB  CV: NEGATIVE for chest pain, palpitations or peripheral  "edema    Physical Examination:   Vital Signs: /67 (BP Location: Right arm)   Pulse 90   Temp 97.2  F (36.2  C) (Temporal)   Resp 16   Ht 1.702 m (5' 7\")   Wt 61.2 kg (135 lb)   SpO2 99%   BMI 21.14 kg/m    GENERAL: healthy, alert and no distress  NECK: no adenopathy, no asymmetry, masses, or scars  RESP: lungs clear to auscultation - no rales, rhonchi or wheezes  CV: regular rate and rhythm, normal S1 S2, no S3 or S4, no murmur, click or rub, no peripheral edema and peripheral pulses strong  ABDOMEN: soft, nontender, no hepatosplenomegaly, no masses and bowel sounds normal  MS: no gross musculoskeletal defects noted, no edema      Plan: Appropriate to proceed as scheduled.      Travon Turner MD  11/10/2023    PCP:  Daya Crane    "

## 2023-11-10 NOTE — ANESTHESIA POSTPROCEDURE EVALUATION
Patient: Julienne Matamoros    Procedure: Procedure(s):  Colonoscopy  Esophagoscopy, gastroscopy, duodenoscopy (EGD), combined WITH DUODENUM POLYPECTOMY       Anesthesia Type:  No value filed.    Note:  Disposition: Outpatient   Postop Pain Control: Uneventful            Sign Out: Well controlled pain   PONV: No   Neuro/Psych: Uneventful            Sign Out: Acceptable/Baseline neuro status   Airway/Respiratory: Uneventful            Sign Out: Acceptable/Baseline resp. status   CV/Hemodynamics: Uneventful            Sign Out: Acceptable CV status; No obvious hypovolemia; No obvious fluid overload   Other NRE: NONE   DID A NON-ROUTINE EVENT OCCUR? No           Last vitals:  Vitals Value Taken Time   BP 96/60 11/10/23 0922   Temp 36.1  C (96.9  F) 11/10/23 0922   Pulse 88 11/10/23 0922   Resp 16 11/10/23 0922   SpO2 100 % 11/10/23 0922       Electronically Signed By: Johnathan Vidal MD  November 10, 2023  9:25 AM

## 2023-11-10 NOTE — ANESTHESIA CARE TRANSFER NOTE
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    Anesthesia Type:   No value filed.     Note:    Oropharynx: oropharynx clear of all foreign objects and spontaneously breathing  Level of Consciousness: awake  Oxygen Supplementation: room air    Independent Airway: airway patency satisfactory and stable  Dentition: dentition unchanged  Vital Signs Stable: post-procedure vital signs reviewed and stable  Report to RN Given: handoff report given  Patient transferred to: Phase II    Handoff Report: Identifed the Patient, Identified the Reponsible Provider, Reviewed the pertinent medical history, Discussed the surgical course, Reviewed Intra-OP anesthesia mangement and issues during anesthesia, Set expectations for post-procedure period and Allowed opportunity for questions and acknowledgement of understanding      Vitals:  Vitals Value Taken Time   BP 96/60 11/10/23 0922   Temp 36.1  C (96.9  F) 11/10/23 0922   Pulse 88 11/10/23 0922   Resp 16 11/10/23 0922   SpO2 100 % 11/10/23 0922       Electronically Signed By: AI Goel CRNA  November 10, 2023  9:23 AM

## 2023-11-13 LAB
PATH REPORT.COMMENTS IMP SPEC: NORMAL
PATH REPORT.COMMENTS IMP SPEC: NORMAL
PATH REPORT.FINAL DX SPEC: NORMAL
PATH REPORT.GROSS SPEC: NORMAL
PATH REPORT.MICROSCOPIC SPEC OTHER STN: NORMAL
PATH REPORT.RELEVANT HX SPEC: NORMAL
PHOTO IMAGE: NORMAL

## 2023-12-26 NOTE — RESULT ENCOUNTER NOTE
Good morning Julienne,    I hope this finds you enjoying the holidays.  I just wanted to let you know that the biopsy in your small intestine on your last upper scope was unremarkable.  Given that you haven't had any polyps on your colonoscopies, I would suggest that you repeat both the upper and lower scopes in 2 years at the same time.      Please let me know if you have any further questions and happy holidays to you and your family!    Dr. Osiel Whipple

## 2024-03-09 ENCOUNTER — HEALTH MAINTENANCE LETTER (OUTPATIENT)
Age: 34
End: 2024-03-09

## 2025-03-16 ENCOUNTER — HEALTH MAINTENANCE LETTER (OUTPATIENT)
Age: 35
End: 2025-03-16

## (undated) DEVICE — SPECIMEN CONTAINER 3OZ W/FORMALIN 59901

## (undated) DEVICE — KIT ENDO TURNOVER/PROCEDURE CARRY-ON 101822

## (undated) DEVICE — SUCTION CATH AIRLIFE TRI-FLO W/CONTROL PORT 14FR  T60C

## (undated) DEVICE — SYR 30ML SLIP TIP W/O NDL 302833

## (undated) DEVICE — ENDO TRAP POLYP E-TRAP 00711099

## (undated) DEVICE — SUCTION MANIFOLD NEPTUNE 2 SYS 1 PORT 702-025-000

## (undated) DEVICE — SNARE CAPIVATOR ROUND COLD SNR BX10 M00561101

## (undated) DEVICE — SOL WATER IRRIG 500ML BOTTLE 2F7113

## (undated) DEVICE — ENDO BITE BLOCK ADULT OMNI-BLOC

## (undated) DEVICE — TUBING SUCTION MEDI-VAC 1/4"X20' N620A

## (undated) DEVICE — GLOVE EXAM NITRILE LG PF LATEX FREE 5064

## (undated) DEVICE — KIT ENDO FIRST STEP DISINFECTANT 200ML W/POUCH EP-4

## (undated) DEVICE — GOWN IMPERVIOUS 2XL BLUE

## (undated) DEVICE — ENDO FORCEP BX CAPTURA PRO SPIKE G50696

## (undated) DEVICE — ENDO SNARE EXACTO COLD 9MM LOOP 2.4MMX230CM 00711115

## (undated) RX ORDER — FENTANYL CITRATE 50 UG/ML
INJECTION, SOLUTION INTRAMUSCULAR; INTRAVENOUS
Status: DISPENSED
Start: 2022-05-18